# Patient Record
Sex: MALE | Race: WHITE | NOT HISPANIC OR LATINO | Employment: OTHER | URBAN - METROPOLITAN AREA
[De-identification: names, ages, dates, MRNs, and addresses within clinical notes are randomized per-mention and may not be internally consistent; named-entity substitution may affect disease eponyms.]

---

## 2018-01-12 ENCOUNTER — HOSPITAL ENCOUNTER (EMERGENCY)
Facility: HOSPITAL | Age: 33
Discharge: HOME | End: 2018-01-12
Attending: EMERGENCY MEDICINE
Payer: MEDICAID

## 2018-01-12 VITALS
BODY MASS INDEX: 44.1 KG/M2 | WEIGHT: 315 LBS | HEIGHT: 71 IN | RESPIRATION RATE: 16 BRPM | OXYGEN SATURATION: 97 % | SYSTOLIC BLOOD PRESSURE: 159 MMHG | TEMPERATURE: 99 F | DIASTOLIC BLOOD PRESSURE: 99 MMHG | HEART RATE: 96 BPM

## 2018-01-12 DIAGNOSIS — T24.109A: Primary | ICD-10-CM

## 2018-01-12 DIAGNOSIS — R06.02 SHORTNESS OF BREATH: ICD-10-CM

## 2018-01-12 LAB
ALBUMIN SERPL BCP-MCNC: 3.8 G/DL
ALP SERPL-CCNC: 83 U/L
ALT SERPL W/O P-5'-P-CCNC: 37 U/L
ANION GAP SERPL CALC-SCNC: 9 MMOL/L
AST SERPL-CCNC: 75 U/L
BASOPHILS # BLD AUTO: 0.02 K/UL
BASOPHILS NFR BLD: 0.2 %
BILIRUB SERPL-MCNC: 1.4 MG/DL
BILIRUB UR QL STRIP: NEGATIVE
BNP SERPL-MCNC: 14 PG/ML
BUN SERPL-MCNC: 12 MG/DL
CALCIUM SERPL-MCNC: 9.2 MG/DL
CHLORIDE SERPL-SCNC: 105 MMOL/L
CLARITY UR: CLEAR
CO2 SERPL-SCNC: 22 MMOL/L
COLOR UR: YELLOW
CREAT SERPL-MCNC: 1 MG/DL
DIFFERENTIAL METHOD: ABNORMAL
EOSINOPHIL # BLD AUTO: 0.2 K/UL
EOSINOPHIL NFR BLD: 1.6 %
ERYTHROCYTE [DISTWIDTH] IN BLOOD BY AUTOMATED COUNT: 12.6 %
EST. GFR  (AFRICAN AMERICAN): >60 ML/MIN/1.73 M^2
EST. GFR  (NON AFRICAN AMERICAN): >60 ML/MIN/1.73 M^2
GLUCOSE SERPL-MCNC: 105 MG/DL
GLUCOSE UR QL STRIP: NEGATIVE
HCT VFR BLD AUTO: 39.3 %
HGB BLD-MCNC: 13.3 G/DL
HGB UR QL STRIP: ABNORMAL
KETONES UR QL STRIP: ABNORMAL
LEUKOCYTE ESTERASE UR QL STRIP: NEGATIVE
LITHIUM SERPL-SCNC: 0.2 MMOL/L
LYMPHOCYTES # BLD AUTO: 1.6 K/UL
LYMPHOCYTES NFR BLD: 13.1 %
MCH RBC QN AUTO: 29.1 PG
MCHC RBC AUTO-ENTMCNC: 33.8 G/DL
MCV RBC AUTO: 86 FL
MICROSCOPIC COMMENT: NORMAL
MONOCYTES # BLD AUTO: 1.7 K/UL
MONOCYTES NFR BLD: 13.6 %
NEUTROPHILS # BLD AUTO: 8.7 K/UL
NEUTROPHILS NFR BLD: 71.5 %
NITRITE UR QL STRIP: NEGATIVE
PH UR STRIP: 6 [PH] (ref 5–8)
PLATELET # BLD AUTO: 244 K/UL
PMV BLD AUTO: 10.4 FL
POTASSIUM SERPL-SCNC: 3.8 MMOL/L
PROT SERPL-MCNC: 7.2 G/DL
PROT UR QL STRIP: NEGATIVE
RBC # BLD AUTO: 4.57 M/UL
RBC #/AREA URNS HPF: 1 /HPF (ref 0–4)
SODIUM SERPL-SCNC: 136 MMOL/L
SP GR UR STRIP: 1.01 (ref 1–1.03)
SQUAMOUS #/AREA URNS HPF: 2 /HPF
TROPONIN I SERPL DL<=0.01 NG/ML-MCNC: 0.02 NG/ML
URN SPEC COLLECT METH UR: ABNORMAL
UROBILINOGEN UR STRIP-ACNC: NEGATIVE EU/DL
WBC # BLD AUTO: 12.17 K/UL
WBC #/AREA URNS HPF: 1 /HPF (ref 0–5)

## 2018-01-12 PROCEDURE — 99284 EMERGENCY DEPT VISIT MOD MDM: CPT | Mod: 25

## 2018-01-12 PROCEDURE — 83880 ASSAY OF NATRIURETIC PEPTIDE: CPT

## 2018-01-12 PROCEDURE — 93005 ELECTROCARDIOGRAM TRACING: CPT

## 2018-01-12 PROCEDURE — 81000 URINALYSIS NONAUTO W/SCOPE: CPT

## 2018-01-12 PROCEDURE — 80178 ASSAY OF LITHIUM: CPT

## 2018-01-12 PROCEDURE — 84484 ASSAY OF TROPONIN QUANT: CPT

## 2018-01-12 PROCEDURE — 85025 COMPLETE CBC W/AUTO DIFF WBC: CPT

## 2018-01-12 PROCEDURE — 36000 PLACE NEEDLE IN VEIN: CPT

## 2018-01-12 PROCEDURE — 80053 COMPREHEN METABOLIC PANEL: CPT

## 2018-01-12 PROCEDURE — 93010 ELECTROCARDIOGRAM REPORT: CPT | Mod: ,,, | Performed by: INTERNAL MEDICINE

## 2018-01-12 RX ORDER — ZIPRASIDONE HYDROCHLORIDE 80 MG/1
80 CAPSULE ORAL 2 TIMES DAILY WITH MEALS
COMMUNITY

## 2018-01-12 RX ORDER — AMOXICILLIN 500 MG/1
500 CAPSULE ORAL 3 TIMES DAILY
Qty: 30 CAPSULE | Refills: 0 | Status: SHIPPED | OUTPATIENT
Start: 2018-01-12 | End: 2018-01-22

## 2018-01-12 RX ORDER — AMLODIPINE BESYLATE 10 MG/1
10 TABLET ORAL DAILY
Status: ON HOLD | COMMUNITY
End: 2024-02-27

## 2018-01-12 RX ORDER — METOPROLOL SUCCINATE 100 MG/1
100 TABLET, EXTENDED RELEASE ORAL DAILY
COMMUNITY

## 2018-01-12 RX ORDER — ESOMEPRAZOLE MAGNESIUM 40 MG/1
40 CAPSULE, DELAYED RELEASE ORAL
COMMUNITY
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
## 2018-01-12 NOTE — ED TRIAGE NOTES
Pt arrived to ED c/o painful from feet to knee bilateral. Pt also reports generalized weakness, always fall asleep, exhaust, constipate for 4 days, frequent urinating at night and could not sleep at night. Pt denies chest pain, fever, chill.
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
## 2024-02-13 PROBLEM — Z13.9 ENCOUNTER FOR MEDICAL SCREENING EXAMINATION: Status: ACTIVE | Noted: 2024-02-13

## 2024-02-13 PROBLEM — I10 HTN (HYPERTENSION): Chronic | Status: ACTIVE | Noted: 2024-02-13

## 2024-02-13 PROBLEM — K21.9 GERD (GASTROESOPHAGEAL REFLUX DISEASE): Chronic | Status: ACTIVE | Noted: 2024-02-13

## 2024-02-17 ENCOUNTER — HOSPITAL ENCOUNTER (EMERGENCY)
Facility: HOSPITAL | Age: 39
Discharge: PSYCHIATRIC OR SUBSTANCE USE FACILITY | End: 2024-02-18
Attending: EMERGENCY MEDICINE
Payer: MEDICAID

## 2024-02-17 DIAGNOSIS — L03.119 CELLULITIS OF LOWER EXTREMITY, UNSPECIFIED LATERALITY: Primary | ICD-10-CM

## 2024-02-17 PROCEDURE — 25000003 PHARM REV CODE 250: Performed by: EMERGENCY MEDICINE

## 2024-02-17 PROCEDURE — 99284 EMERGENCY DEPT VISIT MOD MDM: CPT

## 2024-02-17 RX ORDER — DOXYCYCLINE 100 MG/1
100 CAPSULE ORAL 2 TIMES DAILY
Qty: 20 CAPSULE | Refills: 0 | Status: ON HOLD | OUTPATIENT
Start: 2024-02-17 | End: 2024-02-27 | Stop reason: HOSPADM

## 2024-02-17 RX ORDER — FUROSEMIDE 20 MG/1
20 TABLET ORAL DAILY
Qty: 14 TABLET | Refills: 0 | Status: ON HOLD | OUTPATIENT
Start: 2024-02-17 | End: 2024-02-27

## 2024-02-17 RX ORDER — FUROSEMIDE 20 MG/1
20 TABLET ORAL
Status: COMPLETED | OUTPATIENT
Start: 2024-02-17 | End: 2024-02-17

## 2024-02-17 RX ORDER — DOXYCYCLINE HYCLATE 100 MG
100 TABLET ORAL
Status: COMPLETED | OUTPATIENT
Start: 2024-02-17 | End: 2024-02-17

## 2024-02-17 RX ADMIN — FUROSEMIDE 20 MG: 20 TABLET ORAL at 11:02

## 2024-02-17 RX ADMIN — DOXYCYCLINE HYCLATE 100 MG: 100 TABLET, COATED ORAL at 11:02

## 2024-02-18 VITALS
TEMPERATURE: 99 F | OXYGEN SATURATION: 98 % | BODY MASS INDEX: 44.1 KG/M2 | SYSTOLIC BLOOD PRESSURE: 131 MMHG | HEART RATE: 77 BPM | DIASTOLIC BLOOD PRESSURE: 62 MMHG | HEIGHT: 71 IN | RESPIRATION RATE: 16 BRPM | WEIGHT: 315 LBS

## 2024-02-18 NOTE — ED PROVIDER NOTES
Encounter Date: 2/17/2024       History     Chief Complaint   Patient presents with    Leg Swelling     Patient reports increased redness, heat and swelling to bilateral lower extremities today.  Patient states pain present to same area 10/10.     38-year-old male brought in by EMS [facility], where he is under [facility] for suicidal ideation, with complaint of bilateral lower extremity cellulitis.  Patient says that over the past 3 days he has had progressive redness over his bilateral lower extremities.  Says that he was started Keflex with no change in symptoms.  Says he has a history of bilateral lower extremity cellulitis.  Has required admission in the past.  Has also been treated with doxycycline and Lasix in the past with improvement in symptoms.  Denies fever.    The history is provided by the patient and the EMS personnel.     Review of patient's allergies indicates:  No Known Allergies  Past Medical History:   Diagnosis Date    Hypertension      No past surgical history on file.  Family History   Problem Relation Age of Onset    Heart disease Father      Social History     Tobacco Use    Smoking status: Never    Smokeless tobacco: Never   Substance Use Topics    Alcohol use: No    Drug use: No     Review of Systems    Physical Exam     Initial Vitals [02/17/24 2227]   BP Pulse Resp Temp SpO2   (!) 154/76 90 16 99 °F (37.2 °C) 96 %      MAP       --         Physical Exam    Constitutional: He appears well-developed and well-nourished. No distress.   HENT:   Head: Normocephalic and atraumatic.   Eyes: EOM are normal.   Neck:   Normal range of motion.  Cardiovascular:  Normal rate.           Pulmonary/Chest: No respiratory distress.   Abdominal: He exhibits no distension.   Musculoskeletal:         General: Normal range of motion.      Cervical back: Normal range of motion.     Neurological: He is alert and oriented to person, place, and time.   Skin: Skin is warm and dry. Rash noted.   See  "clinical image below    Psychiatric: He has a normal mood and affect.         ED Course   Procedures  Labs Reviewed - No data to display       Imaging Results    None          Medications   doxycycline tablet 100 mg (has no administration in time range)   furosemide tablet 20 mg (has no administration in time range)     Medical Decision Making  38-year-old male with past medical history as below brought in by EMS with complaint of bilateral lower extremity cellulitis.  Differential includes but isn't limited to cellulitis, osteomyelitis, thrombophlebitis, contact dermatitis.  Upon arrival he was afebrile, stable vital signs.  Patient has previously responded well to doxycycline and Lasix, will do a trial of this.  No indication for labs or hospitalization this time.  Discharged back to behavioral health facility.    No acute emergent medical condition has been identified. The patient appears to be low risk for an emergent medical condition is appropriate for discharge with outpatient f/u as detailed in discharge instructions for reevaluation and possible continued outpatient workup and management. I have discussed the workup with the patient, who has verbalized understanding of the plan and need for outpatient follow-up.  This evaluation does not preclude the development of an emergent condition so in addition, I have advised the patient that they can return to the ED at any time with worsening or change of their symptoms, or with any other medical complaint.       Amount and/or Complexity of Data Reviewed  External Data Reviewed: notes.     Details: Chart review of OSH records PCP visit from 2/16/23 "Was given doxycycline which he is still currently taking and has at least 4-5 more days. Was also given 20 mg of Lasix twice a day to help with the edema. Patient still currently taking this."     Risk  Prescription drug management.  Decision regarding hospitalization.  Diagnosis or treatment significantly limited by " social determinants of health.                        Clinical Impression:  Final diagnoses:  [L03.119] Cellulitis of lower extremity, unspecified laterality - bilateral (Primary)          ED Disposition Condition    Discharge Stable          ED Prescriptions       Medication Sig Dispense Start Date End Date Auth. Provider    doxycycline (VIBRAMYCIN) 100 MG Cap Take 1 capsule (100 mg total) by mouth 2 (two) times daily. for 10 days 20 capsule 2/17/2024 2/27/2024 Kelli Salas MD    furosemide (LASIX) 20 MG tablet Take 1 tablet (20 mg total) by mouth once daily. for 14 days 14 tablet 2/17/2024 3/2/2024 Kelli Salas MD          Follow-up Information    None          Kelli Salas MD  02/17/24 6930
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
## 2024-02-18 NOTE — DISCHARGE INSTRUCTIONS

## 2024-02-23 ENCOUNTER — HOSPITAL ENCOUNTER (INPATIENT)
Facility: HOSPITAL | Age: 39
LOS: 4 days | Discharge: HOME | DRG: 603 | End: 2024-02-27
Attending: EMERGENCY MEDICINE | Admitting: INTERNAL MEDICINE
Payer: MEDICAID

## 2024-02-23 DIAGNOSIS — L03.116 CELLULITIS OF LEFT LOWER EXTREMITY: ICD-10-CM

## 2024-02-23 DIAGNOSIS — I10 PRIMARY HYPERTENSION: Chronic | ICD-10-CM

## 2024-02-23 DIAGNOSIS — D64.9 NORMOCYTIC ANEMIA: ICD-10-CM

## 2024-02-23 DIAGNOSIS — I89.0 LYMPHEDEMA: ICD-10-CM

## 2024-02-23 DIAGNOSIS — F31.9 BIPOLAR AFFECTIVE DISORDER, REMISSION STATUS UNSPECIFIED: ICD-10-CM

## 2024-02-23 DIAGNOSIS — R06.01 ORTHOPNEA: ICD-10-CM

## 2024-02-23 DIAGNOSIS — R45.851 SUICIDAL IDEATION: ICD-10-CM

## 2024-02-23 DIAGNOSIS — L03.119 CELLULITIS OF LOWER EXTREMITY, UNSPECIFIED LATERALITY: Primary | ICD-10-CM

## 2024-02-23 DIAGNOSIS — I87.8 VENOUS STASIS: ICD-10-CM

## 2024-02-23 PROBLEM — F43.10 PTSD (POST-TRAUMATIC STRESS DISORDER): Status: ACTIVE | Noted: 2024-02-23

## 2024-02-23 PROBLEM — F41.9 ANXIETY: Status: ACTIVE | Noted: 2024-02-23

## 2024-02-23 LAB
ALBUMIN SERPL BCP-MCNC: 3 G/DL (ref 3.5–5.2)
ALP SERPL-CCNC: 59 U/L (ref 55–135)
ALT SERPL W/O P-5'-P-CCNC: 12 U/L (ref 10–44)
ANION GAP SERPL CALC-SCNC: 8 MMOL/L (ref 8–16)
AST SERPL-CCNC: 15 U/L (ref 10–40)
BASOPHILS # BLD AUTO: 0.05 K/UL (ref 0–0.2)
BASOPHILS NFR BLD: 0.6 % (ref 0–1.9)
BILIRUB SERPL-MCNC: 0.3 MG/DL (ref 0.1–1)
BNP SERPL-MCNC: 46 PG/ML (ref 0–99)
BUN SERPL-MCNC: 9 MG/DL (ref 6–20)
CALCIUM SERPL-MCNC: 8.5 MG/DL (ref 8.7–10.5)
CHLORIDE SERPL-SCNC: 104 MMOL/L (ref 95–110)
CHOLEST SERPL-MCNC: 154 MG/DL (ref 120–199)
CHOLEST/HDLC SERPL: 4.8 {RATIO} (ref 2–5)
CO2 SERPL-SCNC: 29 MMOL/L (ref 23–29)
CREAT SERPL-MCNC: 1 MG/DL (ref 0.5–1.4)
DIFFERENTIAL METHOD BLD: ABNORMAL
EOSINOPHIL # BLD AUTO: 0.4 K/UL (ref 0–0.5)
EOSINOPHIL NFR BLD: 4.8 % (ref 0–8)
ERYTHROCYTE [DISTWIDTH] IN BLOOD BY AUTOMATED COUNT: 12.5 % (ref 11.5–14.5)
EST. GFR  (NO RACE VARIABLE): >60 ML/MIN/1.73 M^2
ESTIMATED AVG GLUCOSE: 97 MG/DL (ref 68–131)
FERRITIN SERPL-MCNC: 46 NG/ML (ref 20–300)
FOLATE SERPL-MCNC: 7 NG/ML (ref 4–24)
GLUCOSE SERPL-MCNC: 102 MG/DL (ref 70–110)
HBA1C MFR BLD: 5 % (ref 4–5.6)
HCT VFR BLD AUTO: 37 % (ref 40–54)
HDLC SERPL-MCNC: 32 MG/DL (ref 40–75)
HDLC SERPL: 20.8 % (ref 20–50)
HGB BLD-MCNC: 11.8 G/DL (ref 14–18)
IMM GRANULOCYTES # BLD AUTO: 0.06 K/UL (ref 0–0.04)
IMM GRANULOCYTES NFR BLD AUTO: 0.7 % (ref 0–0.5)
IRON SERPL-MCNC: 39 UG/DL (ref 45–160)
LDLC SERPL CALC-MCNC: 88 MG/DL (ref 63–159)
LYMPHOCYTES # BLD AUTO: 1.8 K/UL (ref 1–4.8)
LYMPHOCYTES NFR BLD: 20.4 % (ref 18–48)
MAGNESIUM SERPL-MCNC: 2 MG/DL (ref 1.6–2.6)
MCH RBC QN AUTO: 29.1 PG (ref 27–31)
MCHC RBC AUTO-ENTMCNC: 31.9 G/DL (ref 32–36)
MCV RBC AUTO: 91 FL (ref 82–98)
MONOCYTES # BLD AUTO: 1.3 K/UL (ref 0.3–1)
MONOCYTES NFR BLD: 14.4 % (ref 4–15)
NEUTROPHILS # BLD AUTO: 5.3 K/UL (ref 1.8–7.7)
NEUTROPHILS NFR BLD: 59.1 % (ref 38–73)
NONHDLC SERPL-MCNC: 122 MG/DL
NRBC BLD-RTO: 0 /100 WBC
PLATELET # BLD AUTO: 243 K/UL (ref 150–450)
PMV BLD AUTO: 9.8 FL (ref 9.2–12.9)
POTASSIUM SERPL-SCNC: 4.1 MMOL/L (ref 3.5–5.1)
PROT SERPL-MCNC: 6 G/DL (ref 6–8.4)
RBC # BLD AUTO: 4.06 M/UL (ref 4.6–6.2)
SATURATED IRON: 10 % (ref 20–50)
SODIUM SERPL-SCNC: 141 MMOL/L (ref 136–145)
TOTAL IRON BINDING CAPACITY: 392 UG/DL (ref 250–450)
TRANSFERRIN SERPL-MCNC: 265 MG/DL (ref 200–375)
TRIGL SERPL-MCNC: 170 MG/DL (ref 30–150)
VIT B12 SERPL-MCNC: 335 PG/ML (ref 210–950)
WBC # BLD AUTO: 8.9 K/UL (ref 3.9–12.7)

## 2024-02-23 PROCEDURE — 82746 ASSAY OF FOLIC ACID SERUM: CPT

## 2024-02-23 PROCEDURE — 83036 HEMOGLOBIN GLYCOSYLATED A1C: CPT

## 2024-02-23 PROCEDURE — G0378 HOSPITAL OBSERVATION PER HR: HCPCS

## 2024-02-23 PROCEDURE — 25000003 PHARM REV CODE 250: Performed by: EMERGENCY MEDICINE

## 2024-02-23 PROCEDURE — 25000242 PHARM REV CODE 250 ALT 637 W/ HCPCS

## 2024-02-23 PROCEDURE — 25000003 PHARM REV CODE 250

## 2024-02-23 PROCEDURE — 63600175 PHARM REV CODE 636 W HCPCS

## 2024-02-23 PROCEDURE — 25000003 PHARM REV CODE 250: Mod: UD | Performed by: EMERGENCY MEDICINE

## 2024-02-23 PROCEDURE — 99285 EMERGENCY DEPT VISIT HI MDM: CPT | Mod: 25

## 2024-02-23 PROCEDURE — 85025 COMPLETE CBC W/AUTO DIFF WBC: CPT | Performed by: EMERGENCY MEDICINE

## 2024-02-23 PROCEDURE — 87040 BLOOD CULTURE FOR BACTERIA: CPT | Performed by: EMERGENCY MEDICINE

## 2024-02-23 PROCEDURE — 82728 ASSAY OF FERRITIN: CPT

## 2024-02-23 PROCEDURE — 83880 ASSAY OF NATRIURETIC PEPTIDE: CPT

## 2024-02-23 PROCEDURE — 82607 VITAMIN B-12: CPT

## 2024-02-23 PROCEDURE — 80053 COMPREHEN METABOLIC PANEL: CPT | Performed by: EMERGENCY MEDICINE

## 2024-02-23 PROCEDURE — 12000002 HC ACUTE/MED SURGE SEMI-PRIVATE ROOM

## 2024-02-23 PROCEDURE — 83540 ASSAY OF IRON: CPT

## 2024-02-23 PROCEDURE — 80061 LIPID PANEL: CPT

## 2024-02-23 PROCEDURE — 83735 ASSAY OF MAGNESIUM: CPT

## 2024-02-23 PROCEDURE — 63600175 PHARM REV CODE 636 W HCPCS: Mod: UD | Performed by: EMERGENCY MEDICINE

## 2024-02-23 RX ORDER — OXYCODONE HYDROCHLORIDE 5 MG/1
10 TABLET ORAL
Status: COMPLETED | OUTPATIENT
Start: 2024-02-23 | End: 2024-02-23

## 2024-02-23 RX ORDER — ZIPRASIDONE HYDROCHLORIDE 20 MG/1
80 CAPSULE ORAL 2 TIMES DAILY WITH MEALS
Status: DISCONTINUED | OUTPATIENT
Start: 2024-02-23 | End: 2024-02-27 | Stop reason: HOSPADM

## 2024-02-23 RX ORDER — ENOXAPARIN SODIUM 100 MG/ML
40 INJECTION SUBCUTANEOUS EVERY 24 HOURS
Status: DISCONTINUED | OUTPATIENT
Start: 2024-02-23 | End: 2024-02-26

## 2024-02-23 RX ORDER — PRAZOSIN HYDROCHLORIDE 1 MG/1
5 CAPSULE ORAL DAILY
Status: ON HOLD | COMMUNITY
End: 2024-02-27

## 2024-02-23 RX ORDER — METOPROLOL SUCCINATE 50 MG/1
100 TABLET, EXTENDED RELEASE ORAL DAILY
Status: DISCONTINUED | OUTPATIENT
Start: 2024-02-24 | End: 2024-02-27 | Stop reason: HOSPADM

## 2024-02-23 RX ORDER — SODIUM CHLORIDE 0.9 % (FLUSH) 0.9 %
10 SYRINGE (ML) INJECTION
Status: DISCONTINUED | OUTPATIENT
Start: 2024-02-23 | End: 2024-02-27 | Stop reason: HOSPADM

## 2024-02-23 RX ORDER — LITHIUM CARBONATE 600 MG/1
600 CAPSULE ORAL NIGHTLY
COMMUNITY

## 2024-02-23 RX ORDER — MIRTAZAPINE 30 MG/1
30 TABLET, FILM COATED ORAL NIGHTLY
Status: ON HOLD | COMMUNITY
End: 2024-02-27

## 2024-02-23 RX ORDER — FUROSEMIDE 10 MG/ML
40 INJECTION INTRAMUSCULAR; INTRAVENOUS ONCE
Status: COMPLETED | OUTPATIENT
Start: 2024-02-23 | End: 2024-02-23

## 2024-02-23 RX ORDER — POLYETHYLENE GLYCOL 3350 17 G/17G
17 POWDER, FOR SOLUTION ORAL DAILY
Status: DISCONTINUED | OUTPATIENT
Start: 2024-02-24 | End: 2024-02-27 | Stop reason: HOSPADM

## 2024-02-23 RX ORDER — LITHIUM CARBONATE 300 MG/1
300 CAPSULE ORAL DAILY
Status: ON HOLD | COMMUNITY
End: 2024-02-27

## 2024-02-23 RX ORDER — MIRTAZAPINE 7.5 MG/1
30 TABLET, FILM COATED ORAL NIGHTLY
Status: DISCONTINUED | OUTPATIENT
Start: 2024-02-23 | End: 2024-02-27 | Stop reason: HOSPADM

## 2024-02-23 RX ORDER — DIVALPROEX SODIUM 250 MG/1
750 TABLET, DELAYED RELEASE ORAL EVERY 12 HOURS
Status: DISCONTINUED | OUTPATIENT
Start: 2024-02-23 | End: 2024-02-27 | Stop reason: HOSPADM

## 2024-02-23 RX ORDER — AMLODIPINE BESYLATE 5 MG/1
10 TABLET ORAL DAILY
Status: DISCONTINUED | OUTPATIENT
Start: 2024-02-24 | End: 2024-02-27 | Stop reason: HOSPADM

## 2024-02-23 RX ORDER — LITHIUM CARBONATE 150 MG/1
300 CAPSULE ORAL DAILY
Status: DISCONTINUED | OUTPATIENT
Start: 2024-02-24 | End: 2024-02-27 | Stop reason: HOSPADM

## 2024-02-23 RX ORDER — LITHIUM CARBONATE 150 MG/1
600 CAPSULE ORAL NIGHTLY
Status: DISCONTINUED | OUTPATIENT
Start: 2024-02-23 | End: 2024-02-27 | Stop reason: HOSPADM

## 2024-02-23 RX ORDER — PRAZOSIN HYDROCHLORIDE 5 MG/1
5 CAPSULE ORAL NIGHTLY
Status: DISCONTINUED | OUTPATIENT
Start: 2024-02-23 | End: 2024-02-27 | Stop reason: HOSPADM

## 2024-02-23 RX ORDER — DIVALPROEX SODIUM 500 MG/1
750 TABLET, DELAYED RELEASE ORAL EVERY 12 HOURS
COMMUNITY

## 2024-02-23 RX ORDER — PANTOPRAZOLE SODIUM 40 MG/1
40 TABLET, DELAYED RELEASE ORAL DAILY
Status: DISCONTINUED | OUTPATIENT
Start: 2024-02-24 | End: 2024-02-27 | Stop reason: HOSPADM

## 2024-02-23 RX ADMIN — MIRTAZAPINE 30 MG: 7.5 TABLET ORAL at 08:02

## 2024-02-23 RX ADMIN — PRAZOSIN HYDROCHLORIDE 5 MG: 5 CAPSULE ORAL at 09:02

## 2024-02-23 RX ADMIN — ZIPRASIDONE HYDROCHLORIDE 80 MG: 20 CAPSULE ORAL at 06:02

## 2024-02-23 RX ADMIN — LITHIUM CARBONATE 600 MG: 150 CAPSULE, GELATIN COATED ORAL at 09:02

## 2024-02-23 RX ADMIN — OXYCODONE 10 MG: 5 TABLET ORAL at 03:02

## 2024-02-23 RX ADMIN — ENOXAPARIN SODIUM 40 MG: 40 INJECTION SUBCUTANEOUS at 06:02

## 2024-02-23 RX ADMIN — DIVALPROEX SODIUM 750 MG: 250 TABLET, DELAYED RELEASE ORAL at 08:02

## 2024-02-23 RX ADMIN — FUROSEMIDE 40 MG: 10 INJECTION, SOLUTION INTRAVENOUS at 06:02

## 2024-02-23 RX ADMIN — VANCOMYCIN HYDROCHLORIDE 2500 MG: 500 INJECTION, POWDER, LYOPHILIZED, FOR SOLUTION INTRAVENOUS at 03:02

## 2024-02-23 NOTE — PROGRESS NOTES
"MEDICAL Progress Note:    S. Medical consult was placed to evaluate patients legs. "They feel worse and I think it is spreading to my hands." Patient has severe cellulitis to bilateral lower legs that seem to be worsening. Order to send patient to ER for out patient failure cellulitis.      No Covid 19-related symptoms.  Reported to be eating well, denies abdominal pain or n/v.  Denies GI or urinary complaints.  Denies chest pain, sob, palpitations, headache, dizziness or visual complaints.,  Denies cough, congestion, sore throat, chills or night sweats      O:   Vitals:    02/23/24 1522   BP:    Pulse:    Resp: 18   Temp:        Results for orders placed or performed during the hospital encounter of 02/23/24   CBC auto differential   Result Value Ref Range    WBC 8.90 3.90 - 12.70 K/uL    RBC 4.06 (L) 4.60 - 6.20 M/uL    Hemoglobin 11.8 (L) 14.0 - 18.0 g/dL    Hematocrit 37.0 (L) 40.0 - 54.0 %    MCV 91 82 - 98 fL    MCH 29.1 27.0 - 31.0 pg    MCHC 31.9 (L) 32.0 - 36.0 g/dL    RDW 12.5 11.5 - 14.5 %    Platelets 243 150 - 450 K/uL    MPV 9.8 9.2 - 12.9 fL    Immature Granulocytes 0.7 (H) 0.0 - 0.5 %    Gran # (ANC) 5.3 1.8 - 7.7 K/uL    Immature Grans (Abs) 0.06 (H) 0.00 - 0.04 K/uL    Lymph # 1.8 1.0 - 4.8 K/uL    Mono # 1.3 (H) 0.3 - 1.0 K/uL    Eos # 0.4 0.0 - 0.5 K/uL    Baso # 0.05 0.00 - 0.20 K/uL    nRBC 0 0 /100 WBC    Gran % 59.1 38.0 - 73.0 %    Lymph % 20.4 18.0 - 48.0 %    Mono % 14.4 4.0 - 15.0 %    Eosinophil % 4.8 0.0 - 8.0 %    Basophil % 0.6 0.0 - 1.9 %    Differential Method Automated    Comprehensive metabolic panel   Result Value Ref Range    Sodium 141 136 - 145 mmol/L    Potassium 4.1 3.5 - 5.1 mmol/L    Chloride 104 95 - 110 mmol/L    CO2 29 23 - 29 mmol/L    Glucose 102 70 - 110 mg/dL    BUN 9 6 - 20 mg/dL    Creatinine 1.0 0.5 - 1.4 mg/dL    Calcium 8.5 (L) 8.7 - 10.5 mg/dL    Total Protein 6.0 6.0 - 8.4 g/dL    Albumin 3.0 (L) 3.5 - 5.2 g/dL    Total Bilirubin 0.3 0.1 - 1.0 mg/dL    " Alkaline Phosphatase 59 55 - 135 U/L    AST 15 10 - 40 U/L    ALT 12 10 - 44 U/L    eGFR >60 >60 mL/min/1.73 m^2    Anion Gap 8 8 - 16 mmol/L       General Appearance:    Alert, cooperative, no distress, appears stated age  Head:    Normocephalic, without obvious abnormality, atraumatic  Eyes:    PERRL, conjunctiva/corneas clear, EOM's intact,   Neck:   Supple,   Lungs:  Clear to auscultation bilaterally, respirations unlabored  Heart:    Regular rate and rhythm, S1 and S2 normal, no murmur,   Abdomen: Soft, non-tender, bowel sounds active all four quadrants,   Extremities:   Extremities normal, atraumatic, no cyanosis or edema  Skin:   Skin color, texture, turgor normal, no rashes or lesions  Neurologic:   CNII-XII intact. Normal strength, sensation and reflexes       throughout        AssessmeHTN: Amlodipine & Metoprolol XL daily, monitor vitals. F/u with PCP s/p d/c     GERD:  PPI daily     Encounter for medical screening exam:  ER note and labs reviewed. Patient has already been medically cleared for inpatient psychiatric treatment at this facility per the ER MD.  Currently, the patient is medically stable for inpatient psychiatric treatment under care of the psychiatry team. Nurse to reconcile active home medical medications and inform medical team for approval to restart.  Follow up any pending labs done in ED and other labs ordered here at [facility].  Reconsult medical team as needed.      Mood disturbance:  Tx rec's per Psych team     PVD w/ venous status ulcers: defer to vascular on d/c.     Cellulitis bilateral lower ext: start keflex QID for 5 days. Defer. 2/19: Patient was sent to ER for continued swelling, ABX changed to doxy and lasix started.  2/23: patient sent back to ER for outpatient failure.     Obese BMI: 63.86: defer to pcp on d/c for weight loss options.      Nicotine dependence: Patch daily, refer to quit line on d/c.      Constipation: docusate now, prn bisacodyl nt/Plan

## 2024-02-23 NOTE — ED PROVIDER NOTES
Emergency Department Encounter  Provider Note  Encounter Date: 2/23/2024    Patient Name: Danyel Chavez  MRN: [id number]    History of Present Illness   HPI  History of Present Illness:    Chief Complaint:   Chief Complaint   Patient presents with    Wound Infection     Pt presents to ED today via [facility] from [facility] on [facility]   For BLE swelling and cellulitis being tx with oral antibx with no relief        38-year-old male presenting with worsening bilateral lower extremity cellulitis after finishing a course of doxycycline.  Patient was diagnosed with cellulitis 2/17; has been in a psychiatric facility.  Patient states that he has had pus draining from the left side and clear fluid from the right.  Thinks that the left side is worsening.  Denies any systemic symptoms.  Reports compliance with medications.    The following PMH/PSH/SocHx/FamHx has been reviewed by myself:    Past Medical History:   Diagnosis Date    Hypertension      No past surgical history on file.  Social History     Tobacco Use    Smoking status: Never    Smokeless tobacco: Never   Substance Use Topics    Alcohol use: No    Drug use: No     Family History   Problem Relation Age of Onset    Heart disease Father        Allergies reviewed:  Review of patient's allergies indicates:  No Known Allergies    Medications reviewed:  Medication List with Changes/Refills   Current Medications    AMLODIPINE (NORVASC) 10 MG TABLET    Take 10 mg by mouth once daily.    DOXYCYCLINE (VIBRAMYCIN) 100 MG CAP    Take 1 capsule (100 mg total) by mouth 2 (two) times daily. for 10 days    ESOMEPRAZOLE (NEXIUM) 40 MG CAPSULE    Take 40 mg by mouth before breakfast.    FUROSEMIDE (LASIX) 20 MG TABLET    Take 1 tablet (20 mg total) by mouth once daily. for 14 days    METOPROLOL SUCCINATE (TOPROL-XL) 100 MG 24 HR TABLET    Take 100 mg by mouth once daily.    ZIPRASIDONE (GEODON) 80 MG CAPSULE    Take 80 mg by mouth 2 (two) times daily with meals.          Physical Exam   Physical Exam    Initial Vitals [02/23/24 1358]   BP Pulse Resp Temp SpO2   (!) 104/56 74 17 98.3 °F (36.8 °C) 98 %      MAP       --           Triage vital signs reviewed.    Constitutional: Well-nourished, well-developed. Not in acute distress.  Morbidly obese.  HENT: Normocephalic, atraumatic. Moist mucous membranes.  Eyes: No conjunctival injection.  Resp: Normal respiratory effort, breathing unlabored.  Cardio: Regular rate and rhythm.  GI: Abdomen non-distended.   MSK:  Bilateral lower extremity cellulitis, erythema, warmth.  Very poor foot hygiene.  Left lower extremity with erythema streaking up the back of the knee  Skin: Warm and dry.   Neuro: Awake and alert. Moves all extremities.    ED Course   Procedures    Medical Decision Making    History Acquisition     The history is provided by the patient.     Review of prior external/non ED notes: seen 2/17 for cellulitis, started on doxycycline; [facility] for SI    Differential Diagnoses   Based on available information and initial assessment, the working Differential Diagnosis includes, but is not limited to:  Cellulitis, abscess, necrotizing fasciitis, osteomyelitis, septic joint, MRSA, DVT, drug eruption, allergic/contact dermatitis, EM/SJS, viral exanthem, local trauma/contusion  .    EKG   EKG ordered and independently reviewed by me:       Labs   Lab tests ordered and independently reviewed by me:    Labs Reviewed   CBC W/ AUTO DIFFERENTIAL - Abnormal; Notable for the following components:       Result Value    RBC 4.06 (*)     Hemoglobin 11.8 (*)     Hematocrit 37.0 (*)     MCHC 31.9 (*)     Immature Granulocytes 0.7 (*)     Immature Grans (Abs) 0.06 (*)     Mono # 1.3 (*)     All other components within normal limits   COMPREHENSIVE METABOLIC PANEL - Abnormal; Notable for the following components:    Calcium 8.5 (*)     Albumin 3.0 (*)     All other components within normal limits   CULTURE, BLOOD   CULTURE, BLOOD       Imaging    Imaging ordered and independently reviewed by me:   Imaging Results    None            Additional Consideration   Danyel Chavez  presents to the Emergency Department today with persistent cellulitis after p.o. antibiotics.  Labs, blood cultures, IV antibiotics, admission.    Additional testing considered but not indicated during the course of this workup: further imaging and labwork, not indicated  Co-morbidities/chronic illness/exacerbation of chronic illness considered which impacted clinical decision making:  Psychiatric illness, morbid obesity  Procedures done in the ED or plan for the OR: No  Social determinants of care considered during development of treatment plan include: Decreased medical literacy  Discussion of management or test interpretation with external provider: Yes, describe:  Admitting team, [facility]  DNR discussion: No    The patient's list of active medications and allergies as documented per RN staff has been reviewed.  Medications given in the ED and/or prescribed:   Medications   vancomycin (VANCOCIN) 2,500 mg in dextrose 5 % (D5W) 500 mL IVPB (2,500 mg Intravenous New Bag 2/23/24 1517)   oxyCODONE immediate release tablet 10 mg (10 mg Oral Given 2/23/24 1522)             ED Course as of 02/23/24 1633   Fri Feb 23, 2024   1631 CBC auto differential(!)  Independently interpreted by me; unremarkable or consistent with the patient's baseline   [CS]   1633 Comprehensive metabolic panel(!)  Independently interpreted by me; unremarkable or consistent with the patient's baseline   [CS]      ED Course User Index  [CS] Shannon Jose MD       Explanation of disposition: Admit    Clinical Impression:     1. Cellulitis of lower extremity, unspecified laterality         ED Disposition Condition    Admit Stable               Shannon Jose MD  02/23/24 1633

## 2024-02-23 NOTE — ED TRIAGE NOTES
"Pt arrived via EMS to ED under [facility] with complaint of redness and swelling noted to bilateral legs stated has been taking doxy with no improvement to redness and swelling, bilateral lower leg warm and tender to touch, pt denies any SI/HI thoughts at the moment, pt states " I need to go home and get cleaned up for my birthday in a few days"   "

## 2024-02-23 NOTE — PROGRESS NOTES
"Pharmacokinetic Initial Assessment: IV Vancomycin    Assessment/Plan:    Initiate intravenous vancomycin with loading dose of 2500 mg once followed by a maintenance dose of vancomycin 2000 mg IV every 12 hours  Desired empiric serum trough concentration is 10 to 15 mcg/mL  Draw vancomycin trough level 60 min prior to fourth dose on 02/25/24 at approximately 200  Pharmacy will continue to follow and monitor vancomycin.      Please contact pharmacy at extension [phone] with any questions regarding this assessment.     Thank you for the consult,   Stephanie Lockett       Patient brief summary:  Danyel Chavez is a 38 y.o. male initiated on antimicrobial therapy with IV Vancomycin for treatment of suspected skin & soft tissue infection    Drug Allergies:   Review of patient's allergies indicates:  No Known Allergies    Actual Body Weight:   213.2 kg    Renal Function:   Estimated Creatinine Clearance: 183.1 mL/min (based on SCr of 1 mg/dL).,     Dialysis Method (if applicable):  N/A    CBC (last 72 hours):  Recent Labs   Lab Result Units 02/23/24  1512   WBC K/uL 8.90   Hemoglobin g/dL 11.8*   Hematocrit % 37.0*   Platelets K/uL 243   Gran % % 59.1   Lymph % % 20.4   Mono % % 14.4   Eosinophil % % 4.8   Basophil % % 0.6   Differential Method  Automated       Metabolic Panel (last 72 hours):  Recent Labs   Lab Result Units 02/23/24  1512   Sodium mmol/L 141   Potassium mmol/L 4.1   Chloride mmol/L 104   CO2 mmol/L 29   Glucose mg/dL 102   BUN mg/dL 9   Creatinine mg/dL 1.0   Albumin g/dL 3.0*   Total Bilirubin mg/dL 0.3   Alkaline Phosphatase U/L 59   AST U/L 15   ALT U/L 12       Drug levels (last 3 results):  No results for input(s): "VANCOMYCINRA", "VANCORANDOM", "VANCOMYCINPE", "VANCOPEAK", "VANCOMYCINTR", "VANCOTROUGH" in the last 72 hours.    Microbiologic Results:  Microbiology Results (last 7 days)       Procedure Component Value Units Date/Time    Blood Culture #1 **CANNOT BE ORDERED STAT** [[id number]] Collected: " 02/23/24 1512    Order Status: Sent Specimen: Blood from Peripheral, Antecubital, Right Updated: 02/23/24 1513    Blood Culture #2 **CANNOT BE ORDERED STAT** [[id number]] Collected: 02/23/24 1512    Order Status: Sent Specimen: Blood from Peripheral, Antecubital, Left Updated: 02/23/24 1513

## 2024-02-23 NOTE — H&P
[facility] Medicine H&P Note     Admitting Team: [facility] Hospitalist Team A  Attending Physician: Swapna Hannon MD   Resident: Chastity   Intern: Whiteny    Date of Admit: 2/23/2024    Chief Complaint     Wound Infection (Pt presents to ED today via [facility] from [facility] behavior on CEC /For BLE swelling and cellulitis being tx with oral antibx with no relief )     Subjective:      History of Present Illness:  Danyel Chavez is a 38 y.o. male with past medical history of  Bipolar disorder, HTN, PTSD. The patient presented to [facility] on 2/23/2024 with a primary complaint of Wound Infection (Pt presents to ED today via [facility] from [facility] behavior on CEC /For BLE swelling and cellulitis being tx with oral antibx with no relief )    The patient was in their usual state of health until two and a half weeks ago where he had noticed redness and swelling of his bilateral legs that started at the ankles. He had noticed that he had pus draining from the left leg and clear fluid draining from the right.  He was hospitalized for SI at [facility] on 2/12 where he was CEC'd. Was diagnosed with cellulitis on 2/14 per PCP in the psychiatric facility and was documented started on Keflex. Visited [place] for cellulitis where he was switched to/started on doxycycline. Despite the doxycycline course, patient had noticed that the redness in his bilateral legs have worsened but has not noticed continuing drainage from his bl lower extremities. Was sent here from [facility]. Currently denying active SI.      Past Medical History:  HTN  Bipolar disorder  PTSD   Reported CHF  Venous Stases BL   Reported hx of Cellulitis     Past Surgical History:  No past surgical history on file.    Allergies:  Review of patient's allergies indicates:  No Known Allergies    Home Medications:  Prior to Admission medications    Medication Sig Start Date End Date Taking?  Authorizing Provider   amLODIPine (NORVASC) 10 MG tablet Take 10 mg by mouth once daily.   Yes Provider, Historical   divalproex (DEPAKOTE) 500 MG TbEC Take 750 mg by mouth every 12 (twelve) hours.   Yes Provider, Historical   doxycycline (VIBRAMYCIN) 100 MG Cap Take 1 capsule (100 mg total) by mouth 2 (two) times daily. for 10 days 24 Yes Kelli Salas MD   esomeprazole (NEXIUM) 40 MG capsule Take 40 mg by mouth before breakfast.   Yes Provider, Historical   furosemide (LASIX) 20 MG tablet Take 1 tablet (20 mg total) by mouth once daily. for 14 days 2/17/24 3/2/24 Yes Kelli Salas MD   lithium (ESKALITH) 300 MG capsule Take 300 mg by mouth once daily.   Yes Provider, Historical   lithium 600 MG capsule Take 600 mg by mouth every evening.   Yes Provider, Historical   metoprolol succinate (TOPROL-XL) 100 MG 24 hr tablet Take 100 mg by mouth once daily.   Yes Provider, Historical   mirtazapine (REMERON) 30 MG tablet Take 30 mg by mouth every evening.   Yes Provider, Historical   prazosin (MINIPRESS) 1 MG Cap Take 5 mg by mouth once daily.   Yes Provider, Historical   ziprasidone (GEODON) 80 MG capsule Take 80 mg by mouth 2 (two) times daily with meals.   Yes Provider, Historical       Family History:  Family History   Problem Relation Age of Onset    Heart disease Father        Social History:  Social History     Tobacco Use    Smoking status: Never    Smokeless tobacco: Never   Substance Use Topics    Alcohol use: No    Drug use: No         Review of Systems:  Pertinent items are noted in HPI. All other systems are reviewed and are negative.    Health Maintaince :   Primary Care Physician: None     Immunizations:   TDap - Reports UTD  Flu - Not UTD  COVID - Not UTD       Objective:   Last 24 Hour Vital Signs:  BP  Min: 104/56  Max: 149/88  Temp  Av.3 °F (36.8 °C)  Min: 98 °F (36.7 °C)  Max: 98.6 °F (37 °C)  Pulse  Av.8  Min: 74  Max: 85  Resp  Av.8  Min: 17  Max: 20  SpO2   "Av %  Min: 95 %  Max: 99 %  Height  Av' 11" (180.3 cm)  Min: 5' 11" (180.3 cm)  Max: 5' 11" (180.3 cm)  Weight  Av.2 kg (470 lb)  Min: 213.2 kg (470 lb)  Max: 213.2 kg (470 lb)  Body mass index is 65.55 kg/m².  No intake/output data recorded.    Physical Examination:  Examination  Constitutional: Well-nourished, well-developed. Not in acute distress.  Morbidly obese.  HENT: Normocephalic, atraumatic. Moist mucous membranes.  Eyes: No conjunctival injection, EOM intact   Resp: Normal respiratory effort, breathing unlabored.  Cardio: Regular rate and rhythm.  GI: Abdomen non-distended.   MSK:  Bilateral lower extremity cellulitis, erythema, warmth.  Very poor foot hygiene.  Left lower extremity with erythema streaking up the back of the knee  Skin: Warm and dry.   Neuro: Awake and alert. Moves all extremities.      Laboratory:  Most Recent Data:  CBC:   Lab Results   Component Value Date    WBC 8.90 2024    HGB 11.8 (L) 2024    HCT 37.0 (L) 2024     2024    MCV 91 2024    RDW 12.5 2024     WBC Differential: 8.9 % N, 4.06 % Bands, 20.4 % L, 14.4 % M, 4.8 % Eo, 0.6 % Baso,   BMP:   Lab Results   Component Value Date     2024    K 4.1 2024     2024    CO2 29 2024    BUN 9 2024    CREATININE 1.0 2024     2024    CALCIUM 8.5 (L) 2024     LFTs:   Lab Results   Component Value Date    PROT 6.0 2024    ALBUMIN 3.0 (L) 2024    BILITOT 0.3 2024    AST 15 2024    ALKPHOS 59 2024    ALT 12 2024     Coags: No results found for: "INR", "PROTIME", "PTT"  FLP:   Lab Results   Component Value Date    CHOL 144 2006    HDL 32.0 (L) 2006    LDLCALC 91.8 2006    TRIG 101 2006    CHOLHDL 22.2 2006     DM:   Lab Results   Component Value Date    LDLCALC 91.8 2006    CREATININE 1.0 2024     Thyroid:   Lab Results   Component Value Date    TSH " "0.83 02/09/2006     Anemia: No results found for: "IRON", "TIBC", "FERRITIN", "FYBXDGCS23", "FOLATE"  Cardiac:   Lab Results   Component Value Date    TROPONINI 0.017 01/12/2018    BNP 14 01/12/2018     Urinalysis:   Lab Results   Component Value Date    COLORU Yellow 01/12/2018    SPECGRAV 1.010 01/12/2018    NITRITE Negative 01/12/2018    KETONESU 1+ (A) 01/12/2018    UROBILINOGEN Negative 01/12/2018    WBCUA 1 01/12/2018       Trended Lab Data:  Recent Labs   Lab 02/23/24  1512   WBC 8.90   HGB 11.8*   HCT 37.0*      MCV 91   RDW 12.5      K 4.1      CO2 29   BUN 9   CREATININE 1.0      PROT 6.0   ALBUMIN 3.0*   BILITOT 0.3   AST 15   ALKPHOS 59   ALT 12       Trended Cardiac Data:  No results for input(s): "TROPONINI", "CKTOTAL", "CKMB", "BNP" in the last 168 hours.    Microbiology Data:  Blood cultures pending     Other Results:  EKG (my interpretation): normal EKG, normal sinus rhythm, unchanged from previous tracings.    Radiology:  Imaging Results    None              Assessment:     Danyel Chavez is a 38 y.o. male with PMH of Bipolar disorder, HTN, GERD, PTSD who presents with gradually worsening leg swelling and erythema for the past two and a half weeks. He was prescribed Keflex (unclear if he was taking it) and then doxycycline since 2/17 but has had worsening swelling and erythema despite antibiotic course. Patient is currently CEC'd for suicidal ideation, which is currently denying now. Patient was started on vancomycin and blood cultures are pending. Patient is being admitted to [facility] for management of left lower extremity cellulitis.      Plan:     Left LE Cellulitis   - Has reported history of recurrent cellulitis in the past   - Worsening redness and swelling of BL LE over the last two weeks  - Record of Keflex on 2/14 at psych facility, patient unsure   - Was seen in [facility] 2/17, was switched to doxycycline   - Worsening redness and swelling of bl " legs, more prominent on the left leg   - Was started on Vancomycin in the ED  - Blood cultures collected in ED, currently pending  - Continue Vancomycin     Worsening bl Venous stasis   - Has documented venous stasis in our system to 2021  - Patient has worsening warmth, erythema, swelling bl from previous ER visit 2/17   - Ordered DVT US Bilaterally       Bipolar disorder  Suicidal ideation   - has several inpatient hospitalizations in the past dating back to 2012 for manic episodes and SI  - Currently CEC'd at [facility] for SI since 2/12/24, patient currently denying active SI   - Patient's mental status appeared stable on initial encounter  - Home meds: Lithium 300 once daily and 600 nightly; Ziprasidone 80 BID, Valproic acid  750 BID   - Continue home meds    Concern for CHF  - No TTE on file, but patient shows bl severe pitting edema  - Home meds: Lasix 20 and metoprolol  daily   - BNP ordered   - TTE ordered   - Continue home meds     HTN   - Home meds: Metoprolol  QD and Amlodipine 10 daily  - Continue home meds    Normocytic anemia:   - H/H: 11.8/ 37.0; unclear baseline    - Iron panel, Ferritin, B12, Folate ordered    GERD   - Home med: Nexium 40 mg BID  - Started on protonix BID     Hx of PTSD   - Continue home Prazosin     Concern for SELVIN   - STOP BANG  score: 6   - Will need sleep study at discharge     Obesity  - BMI >65  - Lipid panel reads elevated Triglycerides  - A1c 5.0 2/23  - Discuss weight loss outpatient     Health Care Maintenance  - A1c 5.0, Lipid Panel wnl,    - Tdap UTD,  Needs Influenza and COVID vaccine      Code Status: Full  DVT Prophylaxis: Lovenox  Diet: Cardiac  Disposition:  Pending medical mx before discharge back to [facility]     Po Neri MD  [facility] Internal Medicine Resident, HO-I    [facility] Medicine Hospitalist Pager numbers:   [facility] Hospitalist Medicine Team A (Sona/Riddhi): [phone]  [facility] Hospitalist Medicine Team B (Galindo/Epifanio):   [phone]

## 2024-02-23 NOTE — PROGRESS NOTES
Rx Message- Vancomycin order changed per protocol - vancomycin 2000 mg adjusted to vancomycin 2500 mg loading dose x 1

## 2024-02-24 PROBLEM — D64.9 NORMOCYTIC ANEMIA: Status: ACTIVE | Noted: 2024-02-24

## 2024-02-24 PROBLEM — I89.0 LYMPHEDEMA: Status: ACTIVE | Noted: 2024-02-24

## 2024-02-24 PROBLEM — F31.9 BIPOLAR DISORDER: Status: ACTIVE | Noted: 2024-02-24

## 2024-02-24 LAB
ALBUMIN SERPL BCP-MCNC: 2.9 G/DL (ref 3.5–5.2)
ALP SERPL-CCNC: 52 U/L (ref 55–135)
ALT SERPL W/O P-5'-P-CCNC: 12 U/L (ref 10–44)
ANION GAP SERPL CALC-SCNC: 7 MMOL/L (ref 8–16)
AORTIC ROOT ANNULUS: 3 CM
AST SERPL-CCNC: 15 U/L (ref 10–40)
AV INDEX (PROSTH): 0.56
AV MEAN GRADIENT: 11 MMHG
AV PEAK GRADIENT: 24 MMHG
AV VALVE AREA BY VELOCITY RATIO: 1.97 CM²
AV VALVE AREA: 2.08 CM²
AV VELOCITY RATIO: 0.53
BASOPHILS # BLD AUTO: 0.04 K/UL (ref 0–0.2)
BASOPHILS NFR BLD: 0.5 % (ref 0–1.9)
BILIRUB SERPL-MCNC: 0.4 MG/DL (ref 0.1–1)
BSA FOR ECHO PROCEDURE: 3.27 M2
BUN SERPL-MCNC: 10 MG/DL (ref 6–20)
CALCIUM SERPL-MCNC: 8.5 MG/DL (ref 8.7–10.5)
CHLORIDE SERPL-SCNC: 103 MMOL/L (ref 95–110)
CO2 SERPL-SCNC: 31 MMOL/L (ref 23–29)
CREAT SERPL-MCNC: 0.9 MG/DL (ref 0.5–1.4)
CV ECHO LV RWT: 0.48 CM
DIFFERENTIAL METHOD BLD: ABNORMAL
DOP CALC AO PEAK VEL: 2.44 M/S
DOP CALC AO VTI: 42.4 CM
DOP CALC LVOT AREA: 3.7 CM2
DOP CALC LVOT DIAMETER: 2.17 CM
DOP CALC LVOT PEAK VEL: 1.3 M/S
DOP CALC LVOT STROKE VOLUME: 88.35 CM3
DOP CALC MV VTI: 27.5 CM
DOP CALCLVOT PEAK VEL VTI: 23.9 CM
E WAVE DECELERATION TIME: 172.92 MSEC
E/A RATIO: 1.21
E/E' RATIO: 7.8 M/S
ECHO LV POSTERIOR WALL: 1.24 CM (ref 0.6–1.1)
EOSINOPHIL # BLD AUTO: 0.4 K/UL (ref 0–0.5)
EOSINOPHIL NFR BLD: 4.3 % (ref 0–8)
ERYTHROCYTE [DISTWIDTH] IN BLOOD BY AUTOMATED COUNT: 12.4 % (ref 11.5–14.5)
EST. GFR  (NO RACE VARIABLE): >60 ML/MIN/1.73 M^2
FRACTIONAL SHORTENING: 35 % (ref 28–44)
GLUCOSE SERPL-MCNC: 101 MG/DL (ref 70–110)
HCT VFR BLD AUTO: 37.7 % (ref 40–54)
HGB BLD-MCNC: 12.2 G/DL (ref 14–18)
IMM GRANULOCYTES # BLD AUTO: 0.04 K/UL (ref 0–0.04)
IMM GRANULOCYTES NFR BLD AUTO: 0.5 % (ref 0–0.5)
INTERVENTRICULAR SEPTUM: 1.48 CM (ref 0.6–1.1)
IVC DIAMETER: 1.53 CM
IVRT: 45.67 MSEC
LA MAJOR: 5.93 CM
LA MINOR: 5.39 CM
LA WIDTH: 4.6 CM
LEFT ATRIUM SIZE: 3.58 CM
LEFT ATRIUM VOLUME INDEX MOD: 17.3 ML/M2
LEFT ATRIUM VOLUME INDEX: 26.1 ML/M2
LEFT ATRIUM VOLUME MOD: 52.32 CM3
LEFT ATRIUM VOLUME: 79.05 CM3
LEFT INTERNAL DIMENSION IN SYSTOLE: 3.36 CM (ref 2.1–4)
LEFT VENTRICLE DIASTOLIC VOLUME INDEX: 42.25 ML/M2
LEFT VENTRICLE DIASTOLIC VOLUME: 128.01 ML
LEFT VENTRICLE MASS INDEX: 97 G/M2
LEFT VENTRICLE SYSTOLIC VOLUME INDEX: 15.2 ML/M2
LEFT VENTRICLE SYSTOLIC VOLUME: 45.99 ML
LEFT VENTRICULAR INTERNAL DIMENSION IN DIASTOLE: 5.17 CM (ref 3.5–6)
LEFT VENTRICULAR MASS: 294.28 G
LV LATERAL E/E' RATIO: 7.8 M/S
LV SEPTAL E/E' RATIO: 7.8 M/S
LVOT MG: 4.12 MMHG
LVOT MV: 0.96 CM/S
LYMPHOCYTES # BLD AUTO: 1.4 K/UL (ref 1–4.8)
LYMPHOCYTES NFR BLD: 16.7 % (ref 18–48)
MCH RBC QN AUTO: 29.1 PG (ref 27–31)
MCHC RBC AUTO-ENTMCNC: 32.4 G/DL (ref 32–36)
MCV RBC AUTO: 90 FL (ref 82–98)
MONOCYTES # BLD AUTO: 1.2 K/UL (ref 0.3–1)
MONOCYTES NFR BLD: 14.1 % (ref 4–15)
MV A" WAVE DURATION": 152.24 MSEC
MV MEAN GRADIENT: 3 MMHG
MV PEAK A VEL: 0.97 M/S
MV PEAK E VEL: 1.17 M/S
MV PEAK GRADIENT: 5 MMHG
MV STENOSIS PRESSURE HALF TIME: 52.91 MS
MV VALVE AREA BY CONTINUITY EQUATION: 3.21 CM2
MV VALVE AREA P 1/2 METHOD: 4.16 CM2
NEUTROPHILS # BLD AUTO: 5.3 K/UL (ref 1.8–7.7)
NEUTROPHILS NFR BLD: 63.9 % (ref 38–73)
NRBC BLD-RTO: 0 /100 WBC
OHS LV EJECTION FRACTION SIMPSONS BIPLANE MOD: 56 %
PISA TR MAX VEL: 3.15 M/S
PLATELET # BLD AUTO: 247 K/UL (ref 150–450)
PMV BLD AUTO: 10.1 FL (ref 9.2–12.9)
POTASSIUM SERPL-SCNC: 4.1 MMOL/L (ref 3.5–5.1)
PROT SERPL-MCNC: 5.9 G/DL (ref 6–8.4)
PULM VEIN S/D RATIO: 1.42
PV PEAK D VEL: 0.5 M/S
PV PEAK S VEL: 0.71 M/S
RA MAJOR: 6.17 CM
RA PRESSURE ESTIMATED: 3 MMHG
RA WIDTH: 3.89 CM
RBC # BLD AUTO: 4.19 M/UL (ref 4.6–6.2)
RIGHT VENTRICULAR END-DIASTOLIC DIMENSION: 2.84 CM
RV TB RVSP: 6 MMHG
RV TISSUE DOPPLER FREE WALL SYSTOLIC VELOCITY 1 (APICAL 4 CHAMBER VIEW): 25 CM/S
SINUS: 3.25 CM
SODIUM SERPL-SCNC: 141 MMOL/L (ref 136–145)
STJ: 2.83 CM
TDI LATERAL: 0.15 M/S
TDI SEPTAL: 0.15 M/S
TDI: 0.15 M/S
TR MAX PG: 40 MMHG
TRICUSPID ANNULAR PLANE SYSTOLIC EXCURSION: 3.84 CM
TV REST PULMONARY ARTERY PRESSURE: 43 MMHG
WBC # BLD AUTO: 8.3 K/UL (ref 3.9–12.7)
Z-SCORE OF LEFT VENTRICULAR DIMENSION IN END DIASTOLE: -30.26
Z-SCORE OF LEFT VENTRICULAR DIMENSION IN END SYSTOLE: -22.62

## 2024-02-24 PROCEDURE — 25000242 PHARM REV CODE 250 ALT 637 W/ HCPCS

## 2024-02-24 PROCEDURE — 96372 THER/PROPH/DIAG INJ SC/IM: CPT

## 2024-02-24 PROCEDURE — 94761 N-INVAS EAR/PLS OXIMETRY MLT: CPT

## 2024-02-24 PROCEDURE — G0378 HOSPITAL OBSERVATION PER HR: HCPCS

## 2024-02-24 PROCEDURE — 63600175 PHARM REV CODE 636 W HCPCS

## 2024-02-24 PROCEDURE — 25000003 PHARM REV CODE 250

## 2024-02-24 PROCEDURE — 85025 COMPLETE CBC W/AUTO DIFF WBC: CPT

## 2024-02-24 PROCEDURE — 11000001 HC ACUTE MED/SURG PRIVATE ROOM

## 2024-02-24 PROCEDURE — 63600175 PHARM REV CODE 636 W HCPCS: Mod: UD | Performed by: INTERNAL MEDICINE

## 2024-02-24 PROCEDURE — 25000003 PHARM REV CODE 250: Performed by: INTERNAL MEDICINE

## 2024-02-24 PROCEDURE — 80053 COMPREHEN METABOLIC PANEL: CPT

## 2024-02-24 RX ORDER — OXYCODONE HYDROCHLORIDE 5 MG/1
5 TABLET ORAL ONCE AS NEEDED
Status: DISCONTINUED | OUTPATIENT
Start: 2024-02-24 | End: 2024-02-27 | Stop reason: HOSPADM

## 2024-02-24 RX ORDER — ACETAMINOPHEN 500 MG
1000 TABLET ORAL EVERY 8 HOURS PRN
Status: DISCONTINUED | OUTPATIENT
Start: 2024-02-24 | End: 2024-02-27 | Stop reason: HOSPADM

## 2024-02-24 RX ORDER — ACETAMINOPHEN 500 MG
500 TABLET ORAL EVERY 6 HOURS PRN
Status: DISCONTINUED | OUTPATIENT
Start: 2024-02-24 | End: 2024-02-24

## 2024-02-24 RX ORDER — CYANOCOBALAMIN 1000 UG/ML
1000 INJECTION, SOLUTION INTRAMUSCULAR; SUBCUTANEOUS ONCE
Status: COMPLETED | OUTPATIENT
Start: 2024-02-24 | End: 2024-02-24

## 2024-02-24 RX ORDER — ACETAMINOPHEN 500 MG
500 TABLET ORAL ONCE
Status: COMPLETED | OUTPATIENT
Start: 2024-02-24 | End: 2024-02-24

## 2024-02-24 RX ADMIN — AMLODIPINE BESYLATE 10 MG: 5 TABLET ORAL at 09:02

## 2024-02-24 RX ADMIN — MIRTAZAPINE 30 MG: 7.5 TABLET ORAL at 08:02

## 2024-02-24 RX ADMIN — VANCOMYCIN HYDROCHLORIDE 2000 MG: 1 INJECTION, POWDER, LYOPHILIZED, FOR SOLUTION INTRAVENOUS at 03:02

## 2024-02-24 RX ADMIN — ZIPRASIDONE HYDROCHLORIDE 80 MG: 20 CAPSULE ORAL at 10:02

## 2024-02-24 RX ADMIN — ACETAMINOPHEN 500 MG: 500 TABLET ORAL at 02:02

## 2024-02-24 RX ADMIN — DIVALPROEX SODIUM 750 MG: 250 TABLET, DELAYED RELEASE ORAL at 08:02

## 2024-02-24 RX ADMIN — PRAZOSIN HYDROCHLORIDE 5 MG: 5 CAPSULE ORAL at 08:02

## 2024-02-24 RX ADMIN — METOPROLOL SUCCINATE 100 MG: 50 TABLET, EXTENDED RELEASE ORAL at 09:02

## 2024-02-24 RX ADMIN — LITHIUM CARBONATE 600 MG: 150 CAPSULE, GELATIN COATED ORAL at 08:02

## 2024-02-24 RX ADMIN — ENOXAPARIN SODIUM 40 MG: 40 INJECTION SUBCUTANEOUS at 06:02

## 2024-02-24 RX ADMIN — PANTOPRAZOLE SODIUM 40 MG: 40 TABLET, DELAYED RELEASE ORAL at 09:02

## 2024-02-24 RX ADMIN — POLYETHYLENE GLYCOL 3350 17 G: 17 POWDER, FOR SOLUTION ORAL at 09:02

## 2024-02-24 RX ADMIN — DIVALPROEX SODIUM 750 MG: 250 TABLET, DELAYED RELEASE ORAL at 09:02

## 2024-02-24 RX ADMIN — ZIPRASIDONE HYDROCHLORIDE 80 MG: 20 CAPSULE ORAL at 06:02

## 2024-02-24 RX ADMIN — CYANOCOBALAMIN 1000 MCG: 1000 INJECTION, SOLUTION INTRAMUSCULAR; SUBCUTANEOUS at 07:02

## 2024-02-24 RX ADMIN — VANCOMYCIN HYDROCHLORIDE 2000 MG: 1 INJECTION, POWDER, LYOPHILIZED, FOR SOLUTION INTRAVENOUS at 02:02

## 2024-02-24 RX ADMIN — ACETAMINOPHEN 500 MG: 500 TABLET ORAL at 03:02

## 2024-02-24 RX ADMIN — LITHIUM CARBONATE 300 MG: 150 CAPSULE, GELATIN COATED ORAL at 09:02

## 2024-02-24 NOTE — ED NOTES
Attempted to call report to the floor  No answer once transferred  Will attempt to call again in approx. 15 min

## 2024-02-24 NOTE — PROGRESS NOTES
"[facility] Medicine Progress Note    Primary Team: [facility] Hospitalist Team A  Attending Physician: Swapna Hannon MD  Resident: Chastity  Intern: Whitney     Subjective:      NAEON. This morning patient laying in bed on exam. Continues to endorse b/l LE pain but otherwise feels well. Area of erythema marked this morning      Objective:     Last 24 Hour Vital Signs:  BP  Min: 104/56  Max: 148/76  Temp  Av.3 °F (36.8 °C)  Min: 98 °F (36.7 °C)  Max: 98.6 °F (37 °C)  Pulse  Av.3  Min: 66  Max: 84  Resp  Av.6  Min: 16  Max: 18  SpO2  Av %  Min: 94 %  Max: 99 %  Height  Av' 11" (180.3 cm)  Min: 5' 11" (180.3 cm)  Max: 5' 11" (180.3 cm)  Weight  Av.2 kg (470 lb)  Min: 213.2 kg (470 lb)  Max: 213.2 kg (470 lb)  I/O last 3 completed shifts:  In: 500 [IV Piggyback:500]  Out: -     Physical Examination:  Constitutional: Well-nourished, well-developed. Not in acute distress.  Morbidly obese.  HENT: Normocephalic, atraumatic. Moist mucous membranes.  Eyes: No conjunctival injection, EOM intact   Resp: Normal respiratory effort, breathing unlabored.  Cardio: Regular rate and rhythm.  GI: Abdomen non-distended.   MSK:  Bilateral lower extremity swelling, erythema, warmth. Worse on the right then the left. LLE with skin breakdown and rash extending past the knee. Very poor foot hygiene.    Skin: Warm and dry.   Neuro: Awake and alert. Moves all extremities.       Laboratory  Laboratory Data Reviewed: yes  Pertinent Findings:  WBC 8.9  BNP 46    Microbiology Data Reviewed: yes  Pertinent Findings:  Blood cultures () - NGTD    Other Results:  EKG (my interpretation): normal EKG, normal sinus rhythm, unchanged from previous tracings. .    Radiology Data Reviewed: yes  Pertinent Findings:  DVT US b/l - negative for DVT    Current Medications:     Infusions:       Scheduled:   amLODIPine  10 mg Oral Daily    cyanocobalamin  1,000 mcg Subcutaneous Once    divalproex  750 mg Oral Q12H    enoxparin  40 mg " Subcutaneous Daily    lithium  300 mg Oral Daily    lithium  600 mg Oral QHS    metoprolol succinate  100 mg Oral Daily    mirtazapine  30 mg Oral QHS    pantoprazole  40 mg Oral Daily    polyethylene glycol  17 g Oral Daily    prazosin  5 mg Oral QHS    vancomycin (VANCOCIN) IV (PEDS and ADULTS)  2,000 mg Intravenous Q12H    ziprasidone  80 mg Oral BID WM        PRN:  sodium chloride 0.9%, Pharmacy to dose Vancomycin consult **AND** vancomycin - pharmacy to dose    Antibiotics and Day Number of Therapy:  Vancomycin (2/24 -> present)    Lines and Day Number of Therapy:  PIV    Assessment:     Danyel Chavez is a 38 y.o. male with PMH of Bipolar disorder, HTN, GERD, PTSD who presents with gradually worsening leg swelling and erythema for the past two and a half weeks. He was prescribed Keflex (unclear if he was taking it) and then doxycycline since 2/17 but has had worsening swelling and erythema despite antibiotic course. Patient is currently CEC'd for suicidal ideation, which is currently denying now. Patient was started on vancomycin and blood cultures are pending. Patient is being admitted to [facility] for management of left lower extremity cellulitis.      Plan:     Left LE Cellulitis   - Has reported history of recurrent cellulitis in the past   - Worsening redness and swelling of BL LE over the last two weeks  - Record of Keflex on 2/14 at psych facility, patient unsure   - Was seen in [facility] ED 2/17, was switched to doxycycline   - Worsening redness and swelling of bl legs, more prominent on the left leg   - Was started on Vancomycin in the ED  - Blood cultures collected in ED, currently pending  - Continue Vancomycin      Worsening bl Venous stasis   - Has documented venous stasis in our system to 2021  - Patient has worsening warmth, erythema, swelling bl from previous ER visit 2/17   - Ordered DVT US negative for DVT     Bipolar disorder  Suicidal ideation   - has several inpatient  hospitalizations in the past dating back to 2012 for manic episodes and SI  - Currently CEC'd at [facility] for SI since 2/12/24, patient currently denying active SI   - Patient's mental status appeared stable on initial encounter  - Home meds: Lithium 300 once daily and 600 nightly; Ziprasidone 80 BID, Valproic acid  750 BID   - Continue home meds     Concern for CHF  - No TTE on file, but patient shows bl severe pitting edema  - Home meds: Lasix 20 and metoprolol  daily   - BNP 69, possible falsely low due to bmi  - TTE ordered   - Continue home meds      HTN   - Home meds: Metoprolol  QD and Amlodipine 10 daily  - Continue home meds     Normocytic anemia:   Low B12 levels   - H/H: 11.8/ 37.0; unclear baseline    - Iron low, TIBC wnl, ferriting wnl  - B12 low, supplementing      GERD   - Home med: Nexium 40 mg BID  - Started on protonix BID      Hx of PTSD   - Continue home Prazosin      Concern for SELVIN   - STOP BANG  score: 6   - Will need sleep study at discharge      Obesity  - BMI >65  - Lipid panel reads elevated Triglycerides  - A1c 5.0 2/23  - Discuss weight loss outpatient      Health Care Maintenance  - A1c 5.0, Lipid Panel wnl,    - Tdap UTD,  Needs Influenza and COVID vaccine     Code Status: Full  DVT Prophylaxis: Lovenox  Diet: Cardiac    Eleuterio Haywood MD  [facility] HO - II    \[facility]\"" Medicine Hospitalist Pager numbers:   \[facility]\"" Hospitalist Medicine Team A (Sona/Riddhi): [phone]  \[facility] [place] [facility]\"" Hospitalist Medicine Team B (Galindo/Epifanio):  [phone]

## 2024-02-24 NOTE — NURSING
Md notified of patient worsening pain on LLE. Patient verbalizing left leg pain 10/10 sharp/ throbbing pain with movement and dull/ throbbing pain at rest. MD arrived at bedside for assessment.

## 2024-02-24 NOTE — PLAN OF CARE
02/24/24 1130   Admission   Initial VN Admission Questions Complete   Communication Issues? None   Shift   Virtual Nurse - Patient Verbalized Approval Of Camera Use   Safety/Activity   Patient Rounds visualized patient;placement of personal items at bedside;bed in low position;bed wheels locked;call light in patient/parent reach;clutter free environment maintained;ID band on  (Pt PECd with sitter at bedside)

## 2024-02-25 LAB — VANCOMYCIN TROUGH SERPL-MCNC: 17 UG/ML (ref 10–22)

## 2024-02-25 PROCEDURE — 25000242 PHARM REV CODE 250 ALT 637 W/ HCPCS

## 2024-02-25 PROCEDURE — 25000003 PHARM REV CODE 250

## 2024-02-25 PROCEDURE — 99900035 HC TECH TIME PER 15 MIN (STAT)

## 2024-02-25 PROCEDURE — 94761 N-INVAS EAR/PLS OXIMETRY MLT: CPT

## 2024-02-25 PROCEDURE — 63600175 PHARM REV CODE 636 W HCPCS: Performed by: INTERNAL MEDICINE

## 2024-02-25 PROCEDURE — 80202 ASSAY OF VANCOMYCIN: CPT | Performed by: INTERNAL MEDICINE

## 2024-02-25 PROCEDURE — 11000001 HC ACUTE MED/SURG PRIVATE ROOM

## 2024-02-25 PROCEDURE — 36415 COLL VENOUS BLD VENIPUNCTURE: CPT | Performed by: INTERNAL MEDICINE

## 2024-02-25 PROCEDURE — 25000003 PHARM REV CODE 250: Mod: UD | Performed by: INTERNAL MEDICINE

## 2024-02-25 PROCEDURE — 63600175 PHARM REV CODE 636 W HCPCS

## 2024-02-25 RX ADMIN — DIVALPROEX SODIUM 750 MG: 250 TABLET, DELAYED RELEASE ORAL at 08:02

## 2024-02-25 RX ADMIN — PRAZOSIN HYDROCHLORIDE 5 MG: 5 CAPSULE ORAL at 08:02

## 2024-02-25 RX ADMIN — POLYETHYLENE GLYCOL 3350 17 G: 17 POWDER, FOR SOLUTION ORAL at 08:02

## 2024-02-25 RX ADMIN — VANCOMYCIN HYDROCHLORIDE 1750 MG: 1.25 INJECTION, POWDER, LYOPHILIZED, FOR SOLUTION INTRAVENOUS at 08:02

## 2024-02-25 RX ADMIN — ZIPRASIDONE HYDROCHLORIDE 80 MG: 20 CAPSULE ORAL at 08:02

## 2024-02-25 RX ADMIN — AMLODIPINE BESYLATE 10 MG: 5 TABLET ORAL at 08:02

## 2024-02-25 RX ADMIN — LITHIUM CARBONATE 300 MG: 150 CAPSULE, GELATIN COATED ORAL at 08:02

## 2024-02-25 RX ADMIN — ENOXAPARIN SODIUM 40 MG: 40 INJECTION SUBCUTANEOUS at 04:02

## 2024-02-25 RX ADMIN — MIRTAZAPINE 30 MG: 7.5 TABLET ORAL at 08:02

## 2024-02-25 RX ADMIN — PANTOPRAZOLE SODIUM 40 MG: 40 TABLET, DELAYED RELEASE ORAL at 08:02

## 2024-02-25 RX ADMIN — ZIPRASIDONE HYDROCHLORIDE 80 MG: 20 CAPSULE ORAL at 04:02

## 2024-02-25 RX ADMIN — METOPROLOL SUCCINATE 100 MG: 50 TABLET, EXTENDED RELEASE ORAL at 08:02

## 2024-02-25 RX ADMIN — LITHIUM CARBONATE 600 MG: 150 CAPSULE, GELATIN COATED ORAL at 08:02

## 2024-02-25 NOTE — PROGRESS NOTES
"[facility] Medicine Progress Note    Primary Team: [facility] Hospitalist Team A  Attending Physician: Swapna Hannon MD  Resident: Chastity  Intern: Whitney     Subjective:      No acute events overnight. No fever, chills, vomiting, or nausea. Still has bilateral leg pain. Feels short of breath, but states not different from baseline. Usually exacerbated with ambulation.      Objective:     Last 24 Hour Vital Signs:  BP  Min: 112/57  Max: 146/66  Temp  Av.7 °F (37.1 °C)  Min: 98.3 °F (36.8 °C)  Max: 99.6 °F (37.6 °C)  Pulse  Av.7  Min: 73  Max: 84  Resp  Av.7  Min: 20  Max: 24  SpO2  Av.7 %  Min: 91 %  Max: 95 %  Height  Av' 11" (180.3 cm)  Min: 5' 11" (180.3 cm)  Max: 5' 11" (180.3 cm)  Weight  Av kg (489 lb 6.6 oz)  Min: 213.2 kg (470 lb)  Max: 226.4 kg (499 lb 1.9 oz)  I/O last 3 completed shifts:  In: 1140 [P.O.:640; IV Piggyback:500]  Out: -     Physical Examination:  Constitutional: Well-nourished, well-developed. Not in acute distress.  Morbidly obese.  HENT: Normocephalic, atraumatic. Moist mucous membranes.  Eyes: No conjunctival injection, EOM intact   Resp: Normal respiratory effort, breathing unlabored.  Cardio: Regular rate and rhythm.  GI: Abdomen non-distended.   MSK:  Bilateral lower extremity swelling, erythema, warmth. Worse on the right then the left. LLE with skin breakdown and rash extending past the knee. Very poor foot hygiene.    Skin: Warm and dry.   Neuro: Awake and alert. Moves all extremities.       Laboratory  Laboratory Data Reviewed: yes  Pertinent Findings:  WBC 8.9  BNP 46    Microbiology Data Reviewed: yes  Pertinent Findings:  Blood cultures () - NGTD    Other Results:  EKG (my interpretation): normal EKG, normal sinus rhythm, unchanged from previous tracings. .    Radiology Data Reviewed: yes  Pertinent Findings:  DVT US b/l - negative for DVT    Current Medications:     Infusions:       Scheduled:   amLODIPine  10 mg Oral Daily    divalproex  750 " mg Oral Q12H    enoxparin  40 mg Subcutaneous Daily    lithium  300 mg Oral Daily    lithium  600 mg Oral QHS    metoprolol succinate  100 mg Oral Daily    mirtazapine  30 mg Oral QHS    pantoprazole  40 mg Oral Daily    polyethylene glycol  17 g Oral Daily    prazosin  5 mg Oral QHS    vancomycin (VANCOCIN) IV (PEDS and ADULTS)  1,750 mg Intravenous Q12H    ziprasidone  80 mg Oral BID WM        PRN:  acetaminophen, oxyCODONE, sodium chloride 0.9%, Pharmacy to dose Vancomycin consult **AND** vancomycin - pharmacy to dose    Antibiotics and Day Number of Therapy:  Vancomycin (2/24 -> present)    Lines and Day Number of Therapy:  PIV    Assessment:     Danyel Chavez is a 38 y.o. male with PMH of Bipolar disorder, HTN, GERD, PTSD who presents with gradually worsening leg swelling and erythema for the past two and a half weeks. He was prescribed Keflex (unclear if he was taking it) and then doxycycline since 2/17 but has had worsening swelling and erythema despite antibiotic course. Patient is currently CEC'd for suicidal ideation, which is currently denying now. Patient was started on vancomycin and blood cultures are pending. Patient is being admitted to [facility] medicine for management of left lower extremity cellulitis.      Plan:     Bilateral lower extremity Cellulitis   - Has reported history of recurrent cellulitis in the past   - Worsening redness and swelling of BL LE over the last two weeks  - Record of Keflex on 2/14 at psych facility, patient unsure   - Was seen in [facility] ED 2/17, was switched to doxycycline   - Worsening redness and swelling of bl legs, more prominent on the left leg   - Was started on Vancomycin in the ED  - Blood cultures collected in ED, currently pending  - Continue Vancomycin for coverage     Worsening bl Venous stasis w/lymphedema  - Has documented venous stasis in our system to 2021  - Patient has worsening warmth, erythema, swelling bl from previous ER visit 2/17   - Ordered  DVT US negative for DVT  - Home meds: Lasix 20 and metoprolol  daily   - BNP 69, possible falsely low due to bmi  - TTE without systolic and diastolic dysfunction, no concerns for heart failure   - Continue home meds     Bipolar disorder  Suicidal ideation   - has several inpatient hospitalizations in the past dating back to 2012 for manic episodes and SI  - Currently CEC'd at [facility] for SI since 2/12/24, patient currently denying active SI   - Patient's mental status appeared stable on initial encounter  - Home meds: Lithium 300 once daily and 600 nightly; Ziprasidone 80 BID, Valproic acid  750 BID   - Continue home meds         HTN   - Home meds: Metoprolol  QD and Amlodipine 10 daily  - Continue home meds     Normocytic anemia:   Low B12 levels   - H/H: 11.8/ 37.0; unclear baseline    - Iron low, TIBC wnl, ferriting wnl  - B12 low, supplementing      GERD   - Home med: Nexium 40 mg BID  - Started on protonix BID      Hx of PTSD   - Continue home Prazosin      Concern for SELVIN   - STOP BANG  score: 6   - Will need sleep study at discharge      Obesity  - BMI >65  - Lipid panel reads elevated Triglycerides  - A1c 5.0 2/23  - Discuss weight loss outpatient      Health Care Maintenance  - A1c 5.0, Lipid Panel wnl,    - Tdap UTD,  Needs Influenza and COVID vaccine     Code Status: Full  DVT Prophylaxis: Lovenox  Diet: Cardiac    Nika Obrien MD  [facility] Internal Medicine HO - I    [facility] Medicine Hospitalist Pager numbers:   [facility] Hospitalist Medicine Team A (Sona/Riddhi): [phone]  [facility] Hospitalist Medicine Team B (Galindo/Epifanio):  [phone]

## 2024-02-25 NOTE — NURSING
Patient verbalized that CEC  on Saturday and that he does not understands why staff are taking the precautions measurements they are taking. MD notified and she explained in person to the patient the reasons and dates on when it expires. RN did the same. Patient verbalizes understanding and has no further questions.

## 2024-02-25 NOTE — PROGRESS NOTES
Pharmacokinetic Assessment Follow Up: IV Vancomycin    Vancomycin serum concentration assessment(s):    The trough level was drawn correctly and can be used to guide therapy at this time. The measurement is above the desired definitive target range of 10 to 15 mcg/mL.    Vancomycin Regimen Plan:    Change regimen to Vancomycin 1750 mg IV every 12 hours with next serum trough concentration measured at 1900 prior to 4th dose on 2/26    Drug levels (last 3 results):  Recent Labs   Lab Result Units 02/25/24  0201   Vancomycin-Trough ug/mL 17.0       Pharmacy will continue to follow and monitor vancomycin.    Please contact pharmacy at extension [phone] for questions regarding this assessment.    Thank you for the consult,   Yenifer Dickson       Patient brief summary:  Danyel Chavez is a 38 y.o. male initiated on antimicrobial therapy with IV Vancomycin for treatment of skin & soft tissue infection    The patient's current regimen is 2000 mg q12h    Drug Allergies:   Review of patient's allergies indicates:  No Known Allergies    Actual Body Weight:   226.4 kg    Renal Function:   Estimated Creatinine Clearance: 211.5 mL/min (based on SCr of 0.9 mg/dL).,     Dialysis Method (if applicable):  N/A    CBC (last 72 hours):  Recent Labs   Lab Result Units 02/23/24  1512 02/23/24  1754 02/24/24  0903   WBC K/uL 8.90  --  8.30   Hemoglobin g/dL 11.8*  --  12.2*   Hemoglobin A1C %  --  5.0  --    Hematocrit % 37.0*  --  37.7*   Platelets K/uL 243  --  247   Gran % % 59.1  --  63.9   Lymph % % 20.4  --  16.7*   Mono % % 14.4  --  14.1   Eosinophil % % 4.8  --  4.3   Basophil % % 0.6  --  0.5   Differential Method  Automated  --  Automated       Metabolic Panel (last 72 hours):  Recent Labs   Lab Result Units 02/23/24  1512 02/23/24  1754 02/24/24  0903   Sodium mmol/L 141  --  141   Potassium mmol/L 4.1  --  4.1   Chloride mmol/L 104  --  103   CO2 mmol/L 29  --  31*   Glucose mg/dL 102  --  101   BUN mg/dL 9  --  10    Creatinine mg/dL 1.0  --  0.9   Albumin g/dL 3.0*  --  2.9*   Total Bilirubin mg/dL 0.3  --  0.4   Alkaline Phosphatase U/L 59  --  52*   AST U/L 15  --  15   ALT U/L 12  --  12   Magnesium mg/dL  --  2.0  --        Vancomycin Administrations:  vancomycin given in the last 96 hours                     vancomycin 2 g in dextrose 5 % 500 mL IVPB (mg) 2,000 mg New Bag 02/24/24 1549     2,000 mg New Bag  0234    vancomycin (VANCOCIN) 2,500 mg in dextrose 5 % (D5W) 500 mL IVPB (mg) 2,500 mg New Bag 02/23/24 1517                    Microbiologic Results:  Microbiology Results (last 7 days)       Procedure Component Value Units Date/Time    Blood Culture #1 **CANNOT BE ORDERED STAT** [[id number]] Collected: 02/23/24 1512    Order Status: Completed Specimen: Blood from Peripheral, Antecubital, Right Updated: 02/24/24 2012     Blood Culture, Routine No Growth to date      No Growth to date    Blood Culture #2 **CANNOT BE ORDERED STAT** [[id number]] Collected: 02/23/24 1512    Order Status: Completed Specimen: Blood from Peripheral, Antecubital, Left Updated: 02/24/24 2012     Blood Culture, Routine No Growth to date      No Growth to date

## 2024-02-25 NOTE — PROGRESS NOTES
Rapid Response Nurse Follow-up Note     20G PIV placed to left lateral AC via ultrasound guidance. Good blood return, flushes well, secured.  Reviewed plan of care with charge RNLazaro  .     Please call Rapid Response RN, Gary Dos Santos RN with any questions or concerns at [phone]

## 2024-02-25 NOTE — PROGRESS NOTES
Inpatient Upgrade Note    Danyel Chavez has warranted treatment spanning two or more midnights of hospital level care for the management of celluitis. He continues to require IV antibiotics. His condition is also complicated by the following comorbidities: Obesity, venous insufficiency    Nika Obrien MD   [facility]- HO-1

## 2024-02-25 NOTE — PLAN OF CARE
Problem: Adult Inpatient Plan of Care  Goal: Plan of Care Review  Outcome: Ongoing, Progressing     Problem: Bariatric Environmental Safety  Goal: Safety Maintained with Care  Outcome: Ongoing, Progressing     Problem: Skin or Soft Tissue Infection  Goal: Absence of Infection Signs and Symptoms  Outcome: Ongoing, Progressing     Problem: Hypertension Acute  Goal: Blood Pressure Within Desired Range  Outcome: Ongoing, Progressing     Problem: Fall Injury Risk  Goal: Absence of Fall and Fall-Related Injury  Outcome: Ongoing, Progressing     Problem: Suicide Risk  Goal: Absence of Self-Harm  Outcome: Ongoing, Progressing

## 2024-02-26 ENCOUNTER — CLINICAL SUPPORT (OUTPATIENT)
Dept: SMOKING CESSATION | Facility: CLINIC | Age: 39
End: 2024-02-26

## 2024-02-26 DIAGNOSIS — F17.210 CIGARETTE SMOKER: Primary | ICD-10-CM

## 2024-02-26 PROCEDURE — 11000001 HC ACUTE MED/SURG PRIVATE ROOM

## 2024-02-26 PROCEDURE — 99900035 HC TECH TIME PER 15 MIN (STAT)

## 2024-02-26 PROCEDURE — 63600175 PHARM REV CODE 636 W HCPCS: Performed by: INTERNAL MEDICINE

## 2024-02-26 PROCEDURE — 25000003 PHARM REV CODE 250: Performed by: INTERNAL MEDICINE

## 2024-02-26 PROCEDURE — 25000242 PHARM REV CODE 250 ALT 637 W/ HCPCS

## 2024-02-26 PROCEDURE — 99406 BEHAV CHNG SMOKING 3-10 MIN: CPT | Mod: S$GLB,,,

## 2024-02-26 PROCEDURE — 94761 N-INVAS EAR/PLS OXIMETRY MLT: CPT

## 2024-02-26 PROCEDURE — 25000003 PHARM REV CODE 250

## 2024-02-26 RX ORDER — ENOXAPARIN SODIUM 100 MG/ML
60 INJECTION SUBCUTANEOUS EVERY 12 HOURS
Status: DISCONTINUED | OUTPATIENT
Start: 2024-02-26 | End: 2024-02-27 | Stop reason: HOSPADM

## 2024-02-26 RX ADMIN — METOPROLOL SUCCINATE 100 MG: 50 TABLET, EXTENDED RELEASE ORAL at 08:02

## 2024-02-26 RX ADMIN — PRAZOSIN HYDROCHLORIDE 5 MG: 5 CAPSULE ORAL at 09:02

## 2024-02-26 RX ADMIN — VANCOMYCIN HYDROCHLORIDE 1750 MG: 1.25 INJECTION, POWDER, LYOPHILIZED, FOR SOLUTION INTRAVENOUS at 08:02

## 2024-02-26 RX ADMIN — DIVALPROEX SODIUM 750 MG: 250 TABLET, DELAYED RELEASE ORAL at 08:02

## 2024-02-26 RX ADMIN — DIVALPROEX SODIUM 750 MG: 250 TABLET, DELAYED RELEASE ORAL at 09:02

## 2024-02-26 RX ADMIN — AMLODIPINE BESYLATE 10 MG: 5 TABLET ORAL at 08:02

## 2024-02-26 RX ADMIN — PANTOPRAZOLE SODIUM 40 MG: 40 TABLET, DELAYED RELEASE ORAL at 08:02

## 2024-02-26 RX ADMIN — ZIPRASIDONE HYDROCHLORIDE 80 MG: 20 CAPSULE ORAL at 04:02

## 2024-02-26 RX ADMIN — MIRTAZAPINE 30 MG: 7.5 TABLET ORAL at 09:02

## 2024-02-26 RX ADMIN — LITHIUM CARBONATE 300 MG: 150 CAPSULE, GELATIN COATED ORAL at 08:02

## 2024-02-26 RX ADMIN — ENOXAPARIN SODIUM 60 MG: 100 INJECTION SUBCUTANEOUS at 09:02

## 2024-02-26 RX ADMIN — VANCOMYCIN HYDROCHLORIDE 1750 MG: 1.25 INJECTION, POWDER, LYOPHILIZED, FOR SOLUTION INTRAVENOUS at 09:02

## 2024-02-26 RX ADMIN — LITHIUM CARBONATE 600 MG: 150 CAPSULE, GELATIN COATED ORAL at 09:02

## 2024-02-26 RX ADMIN — ZIPRASIDONE HYDROCHLORIDE 80 MG: 20 CAPSULE ORAL at 08:02

## 2024-02-26 NOTE — PROGRESS NOTES
Pharmacist Renal Dose Adjustment Note    Danyel Chavez is a 38 y.o. male being treated with the medication enoxaparin    Patient Data:    Vital Signs (Most Recent):  Temp: 97.8 °F (36.6 °C) (02/26/24 1144)  Pulse: 63 (02/26/24 1144)  Resp: 17 (02/26/24 1144)  BP: (!) 145/66 (02/26/24 1144)  SpO2: 95 % (02/26/24 1144) Vital Signs (72h Range):  Temp:  [97.8 °F (36.6 °C)-99.6 °F (37.6 °C)]   Pulse:  [59-84]   Resp:  [16-24]   BP: (112-151)/(55-71)   SpO2:  [90 %-96 %]      Recent Labs   Lab 02/23/24  1512 02/24/24  0903   CREATININE 1.0 0.9     Serum creatinine: 0.9 mg/dL 02/24/24 0903  Estimated creatinine clearance: 211.5 mL/min    Medication:Enoxaparin dose: 40mg frequency daily will be changed to medication:enoxaparin dose:60mg frequency:BID    Pharmacist's Name: Katie Mcclain  Pharmacist's Extension: [phone]

## 2024-02-26 NOTE — PLAN OF CARE
DANI met with pt at bedside this AM to complete DCA. Pt reported that he lives at home with his mother Elaine [phone]. Pt does not have any HME at home. DANI sent PEC/CEC to [facility]. White board updated with CM name and contact information.  Discharge brochure provided.  Pt encouraged to call with any questions or concerns.  Cm will continue to follow pt through transitions of care and assist with any discharge needs.    Tarun Sagastume, MSGISSELLE  [phone]    No future appointments.     02/26/24 1319   Discharge Assessment   Assessment Type Discharge Planning Assessment   Confirmed/corrected address, phone number and insurance Yes   Confirmed Demographics Correct on Facesheet   Source of Information patient   When was your last doctors appointment?   (Per pt last month)   Communicated WAGNER with patient/caregiver Yes   Reason For Admission Cellulitis of left lower extremity   People in Home parent(s)   Facility Arrived From: home   Do you expect to return to your current living situation? Yes   Do you have help at home or someone to help you manage your care at home? Yes   Who are your caregiver(s) and their phone number(s)? mom elaine [phone]   Prior to hospitilization cognitive status: Alert/Oriented   Current cognitive status: Alert/Oriented   Walking or Climbing Stairs Difficulty no   Dressing/Bathing Difficulty no   Do you have any problems with: Errands/Grocery   Home Accessibility wheelchair accessible   Home Layout Able to live on 1st floor   Equipment Currently Used at Home none   Readmission within 30 days? No   Patient currently being followed by outpatient case management? No   Do you currently have service(s) that help you manage your care at home? No   Do you take prescription medications? Yes   Do you have prescription coverage? Yes   Coverage medicaid   Do you have any problems affording any of your prescribed medications? No   Is the patient taking medications as prescribed? yes   How do you get to doctors  appointments? family or friend will provide   Are you on dialysis? No   Do you take coumadin? No   Discharge Plan [facility]   DME Needed Upon Discharge  none   Discharge Plan discussed with: Patient   Transition of Care Barriers None

## 2024-02-26 NOTE — PROGRESS NOTES
Smoking cessation education note: Pt states that he started smoking at age 14 or 15, and that he smoked 1 pk/day cigarettes plus a few cigars.  He states that he has smoked on and off, but has not smoked at all in the past few years. Education provided on the risks of continued smoking as well as benefits of staying quit. Handout provided and pt encouraged to contact clinic if additional smoking cessation resources are needed.

## 2024-02-26 NOTE — PLAN OF CARE
Problem: Adult Inpatient Plan of Care  Goal: Plan of Care Review  Outcome: Ongoing, Progressing     Problem: Bariatric Environmental Safety  Goal: Safety Maintained with Care  Outcome: Ongoing, Progressing     Problem: Skin or Soft Tissue Infection  Goal: Absence of Infection Signs and Symptoms  Outcome: Ongoing, Progressing     Problem: Fall Injury Risk  Goal: Absence of Fall and Fall-Related Injury  Outcome: Ongoing, Progressing

## 2024-02-26 NOTE — PROGRESS NOTES
[facility] Medicine Progress Note    Primary Team: [facility] Hospitalist Team A  Attending Physician: Swapna Hannon MD  Resident: Chastity  Intern: Whitney     Subjective:      NAEON. This morning patient laying in bed without complaints. States leg pain is improving/ Has noted more purulance expressing from his legs.      Objective:     Last 24 Hour Vital Signs:  BP  Min: 115/55  Max: 129/59  Temp  Av.3 °F (36.8 °C)  Min: 97.8 °F (36.6 °C)  Max: 98.5 °F (36.9 °C)  Pulse  Av.8  Min: 69  Max: 78  Resp  Av.9  Min: 18  Max: 21  SpO2  Av.8 %  Min: 90 %  Max: 95 %  I/O last 3 completed shifts:  In: 180 [P.O.:180]  Out: 1700 [Urine:1700]    Physical Examination:  Constitutional: Well-nourished, well-developed. Not in acute distress.  Morbidly obese.  HENT: Normocephalic, atraumatic. Moist mucous membranes.  Eyes: No conjunctival injection, EOM intact   Resp: Normal respiratory effort, breathing unlabored.  Cardio: Regular rate and rhythm.  GI: Abdomen non-distended.   MSK:  Bilateral lower extremity swelling, erythema, warmth. Worse on the right then the left. LLE with skin breakdown and rash extending past the knee. Very poor foot hygiene.  Improving   Skin: Warm and dry.   Neuro: Awake and alert. Moves all extremities.       Laboratory  Laboratory Data Reviewed: yes  Pertinent Findings:  WBC 8.9  BNP 46    Microbiology Data Reviewed: yes  Pertinent Findings:  Blood cultures () - NGTD    Other Results:  EKG (my interpretation): normal EKG, normal sinus rhythm, unchanged from previous tracings. .    Radiology Data Reviewed: yes  Pertinent Findings:  DVT US b/l - negative for DVT    Current Medications:     Infusions:       Scheduled:   amLODIPine  10 mg Oral Daily    divalproex  750 mg Oral Q12H    enoxparin  40 mg Subcutaneous Daily    lithium  300 mg Oral Daily    lithium  600 mg Oral QHS    metoprolol succinate  100 mg Oral Daily    mirtazapine  30 mg Oral QHS    pantoprazole  40 mg Oral Daily     polyethylene glycol  17 g Oral Daily    prazosin  5 mg Oral QHS    vancomycin (VANCOCIN) IV (PEDS and ADULTS)  1,750 mg Intravenous Q12H    ziprasidone  80 mg Oral BID WM        PRN:  acetaminophen, oxyCODONE, sodium chloride 0.9%, Pharmacy to dose Vancomycin consult **AND** vancomycin - pharmacy to dose    Antibiotics and Day Number of Therapy:  Vancomycin (2/24 -> present)    Lines and Day Number of Therapy:  PIV    Assessment:     Danyel Chavez is a 38 y.o. male with PMH of Bipolar disorder, HTN, GERD, PTSD who presents with gradually worsening leg swelling and erythema for the past two and a half weeks. He was prescribed Keflex (unclear if he was taking it) and then doxycycline since 2/17 but has had worsening swelling and erythema despite antibiotic course. Patient is currently CEC'd for suicidal ideation, which is currently denying now. Patient was started on vancomycin and blood cultures are pending. Patient is being admitted to [facility] medicine for management of left lower extremity cellulitis.      Plan:     Bilateral lower extremity Cellulitis   - Has reported history of recurrent cellulitis in the past   - Worsening redness and swelling of BL LE over the last two weeks  - Record of Keflex on 2/14 at psych facility, patient unsure   - Was seen in [facility] ED 2/17, was switched to doxycycline   - Worsening redness and swelling of bl legs, more prominent on the left leg   - Was started on Vancomycin in the ED  - Blood cultures collected in ED, currently pending  - Continue Vancomycin for coverage  - Monitoring sCr while on vancomycin     Worsening bl Venous stasis w/lymphedema  - Has documented venous stasis in our system to 2021  - Patient has worsening warmth, erythema, swelling bl from previous ER visit 2/17   - Ordered DVT US negative for DVT  - Home meds: Lasix 20 and metoprolol  daily   - BNP 69, possible falsely low due to bmi  - TTE without systolic and diastolic dysfunction, no concerns  for heart failure   - Continue home meds     Bipolar disorder  Suicidal ideation   - has several inpatient hospitalizations in the past dating back to 2012 for manic episodes and SI  - Currently CEC'd at [facility] for SI since 2/12/24, patient currently denying active SI   - Patient's mental status appeared stable on initial encounter  - Home meds: Lithium 300 once daily and 600 nightly; Ziprasidone 80 BID, Valproic acid  750 BID   - Continue home meds      HTN   - Home meds: Metoprolol  QD and Amlodipine 10 daily  - Continue home meds     Normocytic anemia:   Low B12 levels   - H/H: 11.8/ 37.0; unclear baseline    - Iron low, TIBC wnl, ferriting wnl  - B12 low, supplementing      GERD   - Home med: Nexium 40 mg BID  - Started on protonix BID      Hx of PTSD   - Continue home Prazosin      Concern for SELVIN   - STOP BANG  score: 6   - Will need sleep study at discharge      Obesity  - BMI >65  - Lipid panel reads elevated Triglycerides  - A1c 5.0 2/23  - Discuss weight loss outpatient      Health Care Maintenance  - A1c 5.0, Lipid Panel wnl,    - Tdap UTD,  Needs Influenza and COVID vaccine     Code Status: Full  DVT Prophylaxis: Lovenox  Diet: Cardiac    Eleuterio Haywood MD  [facility] Internal Medicine HO - II    [facility] Medicine Hospitalist Pager numbers:   [facility] Hospitalist Medicine Team A (Sona/Riddhi): [phone]  [facility] Hospitalist Medicine Team B (Galindo/Epifanio):  [phone]

## 2024-02-27 VITALS
OXYGEN SATURATION: 94 % | HEIGHT: 71 IN | SYSTOLIC BLOOD PRESSURE: 137 MMHG | WEIGHT: 315 LBS | TEMPERATURE: 98 F | DIASTOLIC BLOOD PRESSURE: 63 MMHG | HEART RATE: 61 BPM | RESPIRATION RATE: 18 BRPM | BODY MASS INDEX: 44.1 KG/M2

## 2024-02-27 LAB
AMPHET+METHAMPHET UR QL: NEGATIVE
BARBITURATES UR QL SCN>200 NG/ML: NEGATIVE
BENZODIAZ UR QL SCN>200 NG/ML: NEGATIVE
BZE UR QL SCN: NEGATIVE
CANNABINOIDS UR QL SCN: NEGATIVE
CREAT UR-MCNC: 32.3 MG/DL (ref 23–375)
ETHANOL SERPL-MCNC: <10 MG/DL
LITHIUM SERPL-SCNC: 0.6 MMOL/L (ref 0.6–1.2)
METHADONE UR QL SCN>300 NG/ML: NEGATIVE
OPIATES UR QL SCN: NEGATIVE
PCP UR QL SCN>25 NG/ML: NEGATIVE
TOXICOLOGY INFORMATION: NORMAL
VALPROATE SERPL-MCNC: 59 UG/ML (ref 50–100)
VANCOMYCIN TROUGH SERPL-MCNC: 15.9 UG/ML (ref 10–22)

## 2024-02-27 PROCEDURE — 94761 N-INVAS EAR/PLS OXIMETRY MLT: CPT

## 2024-02-27 PROCEDURE — 80202 ASSAY OF VANCOMYCIN: CPT | Performed by: INTERNAL MEDICINE

## 2024-02-27 PROCEDURE — 99900035 HC TECH TIME PER 15 MIN (STAT)

## 2024-02-27 PROCEDURE — 80307 DRUG TEST PRSMV CHEM ANLYZR: CPT

## 2024-02-27 PROCEDURE — 25000003 PHARM REV CODE 250

## 2024-02-27 PROCEDURE — 63600175 PHARM REV CODE 636 W HCPCS: Performed by: INTERNAL MEDICINE

## 2024-02-27 PROCEDURE — 99223 1ST HOSP IP/OBS HIGH 75: CPT | Mod: AF,HB,, | Performed by: PSYCHIATRY & NEUROLOGY

## 2024-02-27 PROCEDURE — 80178 ASSAY OF LITHIUM: CPT

## 2024-02-27 PROCEDURE — 82077 ASSAY SPEC XCP UR&BREATH IA: CPT

## 2024-02-27 PROCEDURE — 36415 COLL VENOUS BLD VENIPUNCTURE: CPT | Mod: XB | Performed by: INTERNAL MEDICINE

## 2024-02-27 PROCEDURE — 80164 ASSAY DIPROPYLACETIC ACD TOT: CPT

## 2024-02-27 PROCEDURE — 36415 COLL VENOUS BLD VENIPUNCTURE: CPT

## 2024-02-27 PROCEDURE — 25000003 PHARM REV CODE 250: Performed by: INTERNAL MEDICINE

## 2024-02-27 RX ORDER — DOXYCYCLINE 100 MG/1
100 CAPSULE ORAL EVERY 12 HOURS
Qty: 14 CAPSULE | Refills: 0 | Status: SHIPPED | OUTPATIENT
Start: 2024-02-28 | End: 2024-03-06

## 2024-02-27 RX ORDER — FUROSEMIDE 20 MG/1
20 TABLET ORAL DAILY
Qty: 14 TABLET | Refills: 0 | Status: SHIPPED | OUTPATIENT
Start: 2024-02-27 | End: 2024-03-12

## 2024-02-27 RX ORDER — CEPHALEXIN 500 MG/1
500 CAPSULE ORAL EVERY 12 HOURS
Qty: 14 CAPSULE | Refills: 0 | Status: SHIPPED | OUTPATIENT
Start: 2024-02-28 | End: 2024-03-06

## 2024-02-27 RX ORDER — PRAZOSIN HYDROCHLORIDE 1 MG/1
5 CAPSULE ORAL DAILY
Qty: 150 CAPSULE | Refills: 11 | Status: SHIPPED | OUTPATIENT
Start: 2024-02-27 | End: 2025-02-25

## 2024-02-27 RX ORDER — AMLODIPINE BESYLATE 10 MG/1
10 TABLET ORAL DAILY
Qty: 30 TABLET | Refills: 11 | Status: SHIPPED | OUTPATIENT
Start: 2024-02-27 | End: 2025-02-18

## 2024-02-27 RX ORDER — MIRTAZAPINE 30 MG/1
30 TABLET, FILM COATED ORAL NIGHTLY
Qty: 30 TABLET | Refills: 11 | Status: SHIPPED | OUTPATIENT
Start: 2024-02-27 | End: 2025-02-25

## 2024-02-27 RX ORDER — AMLODIPINE BESYLATE 10 MG/1
10 TABLET ORAL DAILY
Qty: 30 TABLET | Refills: 11 | Status: SHIPPED | OUTPATIENT
Start: 2024-02-27 | End: 2024-02-27

## 2024-02-27 RX ORDER — MIRTAZAPINE 30 MG/1
30 TABLET, FILM COATED ORAL NIGHTLY
Qty: 30 TABLET | Refills: 11 | Status: SHIPPED | OUTPATIENT
Start: 2024-02-27 | End: 2024-02-27

## 2024-02-27 RX ORDER — LITHIUM CARBONATE 300 MG/1
300 CAPSULE ORAL DAILY
Qty: 30 CAPSULE | Refills: 1 | Status: SHIPPED | OUTPATIENT
Start: 2024-02-27 | End: 2024-02-27

## 2024-02-27 RX ORDER — LITHIUM CARBONATE 300 MG/1
300 CAPSULE ORAL DAILY
Qty: 30 CAPSULE | Refills: 1 | Status: SHIPPED | OUTPATIENT
Start: 2024-02-27 | End: 2024-04-23

## 2024-02-27 RX ORDER — PRAZOSIN HYDROCHLORIDE 1 MG/1
5 CAPSULE ORAL DAILY
Qty: 150 CAPSULE | Refills: 11 | Status: SHIPPED | OUTPATIENT
Start: 2024-02-27 | End: 2024-02-27

## 2024-02-27 RX ADMIN — AMLODIPINE BESYLATE 10 MG: 5 TABLET ORAL at 08:02

## 2024-02-27 RX ADMIN — ZIPRASIDONE HYDROCHLORIDE 80 MG: 20 CAPSULE ORAL at 05:02

## 2024-02-27 RX ADMIN — DIVALPROEX SODIUM 750 MG: 250 TABLET, DELAYED RELEASE ORAL at 08:02

## 2024-02-27 RX ADMIN — METOPROLOL SUCCINATE 100 MG: 50 TABLET, EXTENDED RELEASE ORAL at 08:02

## 2024-02-27 RX ADMIN — PANTOPRAZOLE SODIUM 40 MG: 40 TABLET, DELAYED RELEASE ORAL at 08:02

## 2024-02-27 RX ADMIN — ENOXAPARIN SODIUM 60 MG: 100 INJECTION SUBCUTANEOUS at 08:02

## 2024-02-27 RX ADMIN — VANCOMYCIN HYDROCHLORIDE 1750 MG: 1.25 INJECTION, POWDER, LYOPHILIZED, FOR SOLUTION INTRAVENOUS at 10:02

## 2024-02-27 RX ADMIN — ZIPRASIDONE HYDROCHLORIDE 80 MG: 20 CAPSULE ORAL at 08:02

## 2024-02-27 RX ADMIN — LITHIUM CARBONATE 300 MG: 150 CAPSULE, GELATIN COATED ORAL at 08:02

## 2024-02-27 NOTE — PLAN OF CARE
[facility]    FACILITY TRANSFER ORDERS      Patient Name: Danyel Chavez  YOB: 1985    PCP: No, Primary Doctor   PCP Address: None  PCP Phone Number: None  PCP Fax: None    Encounter Date: 02/27/2024    Admit to: Inpatient [facility]    Vital Signs:  Routine    Diagnoses:   Active Hospital Problems    Diagnosis  POA    *Cellulitis of left lower extremity [L03.116]  Yes    Lymphedema [I89.0]  Yes    Normocytic anemia [D64.9]  Unknown    Bipolar disorder [F31.9]  Yes    Venous stasis [I87.8]  Yes    Anxiety [F41.9]  No    HTN (hypertension) [I10]  Yes     Chronic      Resolved Hospital Problems    Diagnosis Date Resolved POA    Suicidal ideation [R47.854] 02/23/2024 Not Applicable       Allergies:Review of patient's allergies indicates:  No Known Allergies    Diet: regular diet    Activities: Activity as tolerated    Goals of Care Treatment Preferences:  Code Status: Full Code      Nursing: Mobilization     Labs: CMP Daily for 7 days     CONSULTS:    Physical Therapy to evaluate and treat.  and Occupational Therapy to evaluate and treat.    MISCELLANEOUS CARE:  None    WOUND CARE ORDERS  Referring for outpatient follow up    Medications: Review discharge medications with patient and family and provide education.      Current Discharge Medication List        START taking these medications    Details   cephALEXin (KEFLEX) 500 MG capsule Take 1 capsule (500 mg total) by mouth every 12 (twelve) hours. for 7 days  Qty: 14 capsule, Refills: 0           CONTINUE these medications which have CHANGED    Details   doxycycline (VIBRAMYCIN) 100 MG Cap Take 1 capsule (100 mg total) by mouth every 12 (twelve) hours. for 7 days  Qty: 14 capsule, Refills: 0           CONTINUE these medications which have NOT CHANGED    Details   amLODIPine (NORVASC) 10 MG tablet Take 10 mg by mouth once daily.      divalproex (DEPAKOTE) 500 MG TbEC Take 750 mg by mouth every 12 (twelve) hours.       esomeprazole (NEXIUM) 40 MG capsule Take 40 mg by mouth before breakfast.      furosemide (LASIX) 20 MG tablet Take 1 tablet (20 mg total) by mouth once daily. for 14 days  Qty: 14 tablet, Refills: 0      !! lithium (ESKALITH) 300 MG capsule Take 300 mg by mouth once daily.      !! lithium 600 MG capsule Take 600 mg by mouth every evening.      metoprolol succinate (TOPROL-XL) 100 MG 24 hr tablet Take 100 mg by mouth once daily.      mirtazapine (REMERON) 30 MG tablet Take 30 mg by mouth every evening.      prazosin (MINIPRESS) 1 MG Cap Take 5 mg by mouth once daily.      ziprasidone (GEODON) 80 MG capsule Take 80 mg by mouth 2 (two) times daily with meals.       !! - Potential duplicate medications found. Please discuss with provider.             Immunizations Administered as of 2/27/2024       No immunizations on file.          Patient has not had covid vaccinations     Some patients may experience side effects after vaccination.  These may include fever, headache, muscle or joint aches.  Most symptoms resolve with 24-48 hours and do not require urgent medical evaluation unless they persist for more than 72 hours or symptoms are concerning for an unrelated medical condition.        _________________________________  Eleuterio Haywood MD  02/27/2024

## 2024-02-27 NOTE — PLAN OF CARE
SW placed ADT22 order and faxed orders to [facility]. [facility] will line up transport and placement for pt. SW will follow.     TANK Her  [phone]    No future appointments.     02/27/24 1029   Final Note   Assessment Type Final Discharge Note   Anticipated Discharge Disposition Psych   What phone number can be called within the next 1-3 days to see how you are doing after discharge? [phone]   Post-Acute Status   Post-Acute Authorization Placement   Post-Acute Placement Status Referrals Sent   Coverage Medicaid   Discharge Delays None known at this time

## 2024-02-27 NOTE — PLAN OF CARE
DANI spoke with pt's mother Ruth [phone] to complete final d/c assessment. Pt's mom will arrive to the hospital around 4pm to help transport the pt home. Ruth did not have any additional questions or concerns for sw at this time. Rounds completed on pt. All questions addressed. Bedside nurse to discuss d/c medications. Discussed importance to attend all f/u appts and take medications as prescribed. Verbalized understanding. Case Management to sign off.     TANK Her  [phone]     02/27/24 1226   Final Note   Assessment Type Final Discharge Note   Anticipated Discharge Disposition Home   What phone number can be called within the next 1-3 days to see how you are doing after discharge? [phone]   Post-Acute Status   Post-Acute Authorization Placement   Coverage [employer]   Discharge Delays None known at this time

## 2024-02-27 NOTE — PLAN OF CARE
Expected Discharge: 2/27/2024Midday  Comment:Pending bed availbility for behavioral placement. ADT 22 placed. State of clearance requested. Orders noted. Assigned CM to assist with communication of behavioral placement as needed. CEC in EPIC with date of 2/13 at 1055 am.  Last reviewed just now by Asiya Velázquez RN
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
## 2024-02-27 NOTE — PROGRESS NOTES
[facility] Medicine Progress Note    Primary Team: [facility] Hospitalist Team A  Attending Physician: Swapna Hannon MD  Resident: Chastity  Intern: Whitney     Subjective:      NAEON. This morning patient laying in bed without complaints. States leg pain has improved.     Objective:     Last 24 Hour Vital Signs:  BP  Min: 104/54  Max: 151/65  Temp  Av.1 °F (36.7 °C)  Min: 97.8 °F (36.6 °C)  Max: 98.5 °F (36.9 °C)  Pulse  Av.6  Min: 59  Max: 78  Resp  Av.2  Min: 17  Max: 18  SpO2  Av.7 %  Min: 93 %  Max: 95 %  I/O last 3 completed shifts:  In: 180 [P.O.:180]  Out: 1700 [Urine:1700]    Physical Examination:  Constitutional: Well-nourished, well-developed. Not in acute distress.  Morbidly obese.  HENT: Normocephalic, atraumatic. Moist mucous membranes.  Eyes: No conjunctival injection, EOM intact   Resp: Normal respiratory effort, breathing unlabored.  Cardio: Regular rate and rhythm.  GI: Abdomen non-distended.   MSK:  Bilateral lower extremity swelling, erythema, warmth. Worse on the right then the left. LLE with skin breakdown and rash extending past the knee. Very poor foot hygiene.  Improving   Skin: Warm and dry.   Neuro: Awake and alert. Moves all extremities.       Laboratory  Laboratory Data Reviewed: yes  Pertinent Findings:  WBC 8.9  BNP 46    Microbiology Data Reviewed: yes  Pertinent Findings:  Blood cultures () - NGTD    Other Results:  EKG (my interpretation): normal EKG, normal sinus rhythm, unchanged from previous tracings. .    Radiology Data Reviewed: yes  Pertinent Findings:  DVT US b/l - negative for DVT    Current Medications:     Infusions:       Scheduled:   amLODIPine  10 mg Oral Daily    divalproex  750 mg Oral Q12H    enoxparin  60 mg Subcutaneous Q12H (prophylaxis, 0900/2100)    lithium  300 mg Oral Daily    lithium  600 mg Oral QHS    metoprolol succinate  100 mg Oral Daily    mirtazapine  30 mg Oral QHS    pantoprazole  40 mg Oral Daily    polyethylene glycol  17 g  Oral Daily    prazosin  5 mg Oral QHS    vancomycin (VANCOCIN) IV (PEDS and ADULTS)  1,750 mg Intravenous Q12H    ziprasidone  80 mg Oral BID WM        PRN:  acetaminophen, oxyCODONE, sodium chloride 0.9%, Pharmacy to dose Vancomycin consult **AND** vancomycin - pharmacy to dose    Antibiotics and Day Number of Therapy:  Vancomycin (2/24 -> present)    Lines and Day Number of Therapy:  PIV    Assessment:     Danyel Chavez is a 38 y.o. male with PMH of Bipolar disorder, HTN, GERD, PTSD who presents with gradually worsening leg swelling and erythema for the past two and a half weeks. He was prescribed Keflex (unclear if he was taking it) and then doxycycline since 2/17 but has had worsening swelling and erythema despite antibiotic course. Patient is currently CEC'd for suicidal ideation, which is currently denying now. Patient was started on vancomycin and blood cultures are pending. Patient is being admitted to [facility] medicine for management of left lower extremity cellulitis.      Plan:     Bilateral lower extremity Cellulitis  - Has reported history of recurrent cellulitis in the past   - Worsening redness and swelling of BL LE over the last two weeks  - Record of Keflex on 2/14 at psych facility, patient unsure   - Was seen in [facility] ED 2/17, was switched to doxycycline   - Worsening redness and swelling of bl legs, more prominent on the left leg   - Was started on Vancomycin in the ED  - Blood cultures collected in ED, NGTD  - Coverage with Vancomycin   - Discharging on Keflex 500 q6 and doxycycline 100 q12 back to facility to complete total 7 day course (end date March 1st)     Worsening bl Venous stasis w/lymphedema  - Has documented venous stasis in our system to 2021  - Patient has worsening warmth, erythema, swelling bl from previous ER visit 2/17   - Ordered DVT US negative for DVT  - Home meds: Lasix 20 and metoprolol  daily   - BNP 69, possible falsely low due to bmi  - TTE without systolic  and diastolic dysfunction, no concerns for heart failure   - Continue home meds     Bipolar disorder  Suicidal ideation   - has several inpatient hospitalizations in the past dating back to 2012 for manic episodes and SI  - Currently CEC'd at [facility] for SI since 2/12/24, patient currently denying active SI   - Patient's mental status appeared stable on initial encounter  - Home meds: Lithium 300 once daily and 600 nightly; Ziprasidone 80 BID, Valproic acid  750 BID   - Continue home meds      HTN   - Home meds: Metoprolol  QD and Amlodipine 10 daily  - Continue home meds     Normocytic anemia:   Low B12 levels   - H/H: 11.8/ 37.0; unclear baseline    - Iron low, TIBC wnl, ferriting wnl  - B12 low, supplementing      GERD   - Home med: Nexium 40 mg BID  - Started on protonix BID      Hx of PTSD   - Continue home Prazosin      Concern for SELVIN   - STOP BANG  score: 6   - Referring for sleep study at discharge      Obesity  - BMI >65  - Lipid panel reads elevated Triglycerides  - A1c 5.0 2/23  - Discuss weight loss outpatient      Health Care Maintenance  - A1c 5.0, Lipid Panel wnl,    - Tdap UTD,  Needs Influenza and COVID vaccine     Dispo: Medically ready for discharge back to inpatient psych today  Code Status: Full  DVT Prophylaxis: Lovenox  Diet: Cardiac    Eleuterio Haywood MD  [facility] HO - II    [facility] Medicine Hospitalist Pager numbers:   [facility] Hospitalist Medicine Team A (Sona/Riddhi): [phone]  [facility] Hospitalist Medicine Team B (Galindo/Epifanio):  [phone]

## 2024-02-27 NOTE — PLAN OF CARE
Problem: Adult Inpatient Plan of Care  Goal: Plan of Care Review  Outcome: Ongoing, Progressing  Goal: Patient-Specific Goal (Individualized)  Outcome: Ongoing, Progressing  Goal: Optimal Comfort and Wellbeing  Outcome: Ongoing, Progressing  Goal: Readiness for Transition of Care  Outcome: Ongoing, Progressing     Problem: Bariatric Environmental Safety  Goal: Safety Maintained with Care  Outcome: Ongoing, Progressing     Problem: Skin or Soft Tissue Infection  Goal: Absence of Infection Signs and Symptoms  Outcome: Ongoing, Progressing     Problem: Suicide Risk  Goal: Absence of Self-Harm  Outcome: Ongoing, Progressing     Problem: Hypertension Acute  Goal: Blood Pressure Within Desired Range  Outcome: Ongoing, Progressing     Problem: Fall Injury Risk  Goal: Absence of Fall and Fall-Related Injury  Outcome: Ongoing, Progressing     Problem: Hypertension Comorbidity  Goal: Blood Pressure in Desired Range  Outcome: Ongoing, Progressing

## 2024-02-28 LAB
BACTERIA BLD CULT: NORMAL
BACTERIA BLD CULT: NORMAL

## 2024-02-28 NOTE — NURSING
Discharge orders noted. IV removed. AVS printed and given to patient. VN reviewed AVS with patient and family. Patient left via wheelchair with family. Paper prescriptions sent with patient. No distress noted.

## 2024-02-28 NOTE — PROGRESS NOTES
Virtual Nurse:Discharge orders noted; additional clinical references attached.  and pharmacy tech notified.  Patient's discharge instruction packet given by bedside RN.    Cued into patient's room.  Permission received per patient to turn camera to view patient.  Introduced as VN that will be instructing on discharge instructions.  Mother and sister at bedside participating in discharge instructions.  Educated patient on reason for admission; medications to hold, continue, and start, appointment to follow-up with doctor, and when to return to ED. Teach back method used. Verbalized understanding  Number given for 24/7 Nurse Line. Opportunity given for questions and questions answered.  Bedside nurse updated.        02/27/24 1819   Shift   Virtual Nurse - Patient Verbalized Approval Of Camera Use;VN Rounding   Safety/Activity   Patient Rounds visualized patient   Activity Assistance Provided independent

## 2024-02-28 NOTE — DISCHARGE SUMMARY
[facility] Hospital Medicine Discharge Summary    Primary Team: [facility] Hospitalist Team A  Attending Physician: Riddhi  Resident: Chastity  Intern: Whitney     Date of Admit: 2/23/2024  Date of Discharge: 2/27/2024    Discharge to: Home  Condition: Good    Discharge Diagnoses     Patient Active Problem List   Diagnosis    Burn erythema of lower limb    HTN (hypertension)    GERD (gastroesophageal reflux disease)    Encounter for medical screening examination    Cellulitis of left lower extremity    Venous stasis    Anxiety    PTSD (post-traumatic stress disorder)    Lymphedema    Normocytic anemia    Bipolar disorder       Consultants and Procedures     Consultants:  Psychaitry     Procedures:   None    Imaging:  US LE     Brief History of Present Illness      Danyel Chavez is a 38 y.o. male with past medical history of  Bipolar disorder, HTN, PTSD. The patient presented to [facility] on 2/23/2024 with a primary complaint of Wound Infection (Pt presents to ED today via [facility] from [facility] on [facility] /For BLE swelling and cellulitis being tx with oral antibx with no relief )     The patient was in their usual state of health until two and a half weeks ago where he had noticed redness and swelling of his bilateral legs that started at the ankles. He had noticed that he had pus draining from the left leg and clear fluid draining from the right.  He was hospitalized for SI at [facility] on 2/12 where he was CEC'd. Was diagnosed with cellulitis on 2/14 per PCP in the psychiatric facility and was documented started on Keflex. Visited [place] for cellulitis where he was switched to/started on doxycycline. Despite the doxycycline course, patient had noticed that the redness in his bilateral legs have worsened but has not noticed continuing drainage from his bl lower extremities. Was sent here from [facility]. Currently denying active SI.    For the full HPI  please refer to the History & Physical from this admission.    Hospital Course By Problem with Pertinent Findings     Bilateral lower extremity Cellulitis  - Worsening redness and swelling of BL LE over the last two weeks  - Record of Keflex on 2/14 at psych facility, patient unsure   - Was seen in [facility] ED 2/17, was switched to doxycycline   - Worsening redness and swelling of bl legs, more prominent on the left leg   - Was started on Vancomycin in the ED  - Blood cultures collected in ED, NGTD  - Coverage with Vancomycin for 3 days  - Discharging on Keflex 500 q6 and doxycycline 100 q12 back to home End date 04/06/2024     Worsening bl Venous stasis w/lymphedema  - Has documented venous stasis in our system to 2021  - Patient has worsening warmth, erythema, swelling bl from previous ER visit 2/17   - Ordered DVT US negative for DVT  - Home meds: Lasix 20 and metoprolol  daily   - TTE without systolic and diastolic dysfunction, no concerns for heart failure   - Continue home medications      Bipolar disorder  Suicidal ideation   - has several inpatient hospitalizations in the past dating back to 2012 for manic episodes and SI  - Currently CEC'd at [facility] for SI since 2/12/24, patient currently denying active SI   - Denies SI at admit  - Home meds: Lithium 300 once daily and 600 nightly; Ziprasidone 80 BID, Valproic acid  750 BID   - continued home meds, evaluated by psychiatry and CEC resended.  - safe to discharge home, referred for outpatient psychiatric follow up      HTN   - Home meds: Metoprolol  QD and Amlodipine 10 daily  - Continue home meds     Normocytic anemia:   Low B12 levels   - H/H: 11.8/ 37.0; unclear baseline    - Iron low, TIBC wnl, ferriting wnl  - B12 low, supplemented     GERD   - Home med: Nexium 40 mg BID  - Started on protonix BID while inpatient  - Restart home medication at discharge      Hx of PTSD   - Continued home Prazosin      Concern for SELVIN   - STOP BANG   score: 6   - Referred for sleep study at discharge      Obesity  - BMI >65  - Lipid panel reads elevated Triglycerides  - A1c 5.0 2/23  - Discuss weight loss outpatient      Health Care Maintenance  - A1c 5.0, Lipid Panel wnl,    - Tdap UTD,  Needs Influenza and COVID vaccine     Dispo: Medically ready for discharge back to inpatient psych today  Code Status: Full  DVT Prophylaxis: Lovenox  Diet: Cardiac     Eleuterio Haywood MD  [facility] Internal Medicine HO - II     [facility] Medicine Hospitalist Pager numbers:   [facility] Hospitalist Medicine Team A (Sona/Riddhi):          [phone]  [facility] Hospitalist Medicine Team B (Galindo/Epifanio):        [phone]    Discharge Medications        Medication List        START taking these medications      cephALEXin 500 MG capsule  Commonly known as: KEFLEX  Take 1 capsule (500 mg total) by mouth every 12 (twelve) hours. for 7 days            CHANGE how you take these medications      doxycycline 100 MG Cap  Commonly known as: VIBRAMYCIN  Take 1 capsule (100 mg total) by mouth every 12 (twelve) hours. for 7 days  What changed: when to take this            CONTINUE taking these medications      amLODIPine 10 MG tablet  Commonly known as: NORVASC  Take 1 tablet (10 mg total) by mouth once daily.     divalproex 500 MG Tbec  Commonly known as: DEPAKOTE     esomeprazole 40 MG capsule  Commonly known as: NEXIUM     furosemide 20 MG tablet  Commonly known as: LASIX  Take 1 tablet (20 mg total) by mouth once daily. for 14 days     * lithium 600 MG capsule     * lithium 300 MG capsule  Commonly known as: ESKALITH  Take 1 capsule (300 mg total) by mouth once daily.     metoprolol succinate 100 MG 24 hr tablet  Commonly known as: TOPROL-XL     mirtazapine 30 MG tablet  Commonly known as: REMERON  Take 1 tablet (30 mg total) by mouth every evening.     prazosin 1 MG Cap  Commonly known as: MINIPRESS  Take 5 capsules (5 mg total) by mouth once daily.     ziprasidone 80 MG capsule  Commonly known as: GEODON            * This list has 2 medication(s) that are the same as other medications prescribed for you. Read the directions carefully, and ask your doctor or other care provider to review them with you.                   Where to Get Your Medications        These medications were sent to [facility] [address] [place] [id number]      Hours: Mon-Fri, 8a-5:30p Phone: [phone]   cephALEXin 500 MG capsule  doxycycline 100 MG Cap       You can get these medications from any pharmacy    Bring a paper prescription for each of these medications  amLODIPine 10 MG tablet  furosemide 20 MG tablet  lithium 300 MG capsule  mirtazapine 30 MG tablet  prazosin 1 MG Cap         Discharge Information:   Diet:  Regular    Physical Activity:  As tolerated             Instructions:  1. Take all medications as prescribed  2. Keep all follow-up appointments  3. Return to the hospital or call your primary care physicians if any worsening symptoms such as fever, chest pain, shortness of breath, return of symptoms, or any other concerns.    Follow-Up Appointments:  PCP  Psychiatry referred  Sleeps study     Eleuterio Haywood MD  [facility] Internal Medicine, [facility]

## 2024-05-20 PROBLEM — Z13.9 ENCOUNTER FOR MEDICAL SCREENING EXAMINATION: Status: RESOLVED | Noted: 2024-02-13 | Resolved: 2024-05-20
